# Patient Record
Sex: MALE | Race: ASIAN | HISPANIC OR LATINO
[De-identification: names, ages, dates, MRNs, and addresses within clinical notes are randomized per-mention and may not be internally consistent; named-entity substitution may affect disease eponyms.]

---

## 2024-06-18 PROBLEM — Z00.00 ENCOUNTER FOR PREVENTIVE HEALTH EXAMINATION: Status: ACTIVE | Noted: 2024-06-18

## 2024-06-19 ENCOUNTER — APPOINTMENT (OUTPATIENT)
Dept: UROLOGY | Facility: CLINIC | Age: 89
End: 2024-06-19
Payer: MEDICARE

## 2024-06-19 VITALS
HEART RATE: 85 BPM | HEIGHT: 64 IN | BODY MASS INDEX: 28.68 KG/M2 | WEIGHT: 168 LBS | DIASTOLIC BLOOD PRESSURE: 55 MMHG | OXYGEN SATURATION: 98 % | SYSTOLIC BLOOD PRESSURE: 160 MMHG

## 2024-06-19 DIAGNOSIS — Z87.440 PERSONAL HISTORY OF URINARY (TRACT) INFECTIONS: ICD-10-CM

## 2024-06-19 LAB
BILIRUB UR QL STRIP: NORMAL
CLARITY UR: CLEAR
COLLECTION METHOD: NORMAL
GLUCOSE UR-MCNC: NORMAL
HCG UR QL: 0.2 EU/DL
HGB UR QL STRIP.AUTO: NORMAL
KETONES UR-MCNC: NORMAL
LEUKOCYTE ESTERASE UR QL STRIP: NORMAL
NITRITE UR QL STRIP: NORMAL
PH UR STRIP: 5.5
PROT UR STRIP-MCNC: ABNORMAL
SP GR UR STRIP: 1.02

## 2024-06-19 PROCEDURE — 81003 URINALYSIS AUTO W/O SCOPE: CPT | Mod: QW

## 2024-06-19 PROCEDURE — 99203 OFFICE O/P NEW LOW 30 MIN: CPT | Mod: 25

## 2024-06-19 RX ORDER — TAMSULOSIN HYDROCHLORIDE 0.4 MG/1
0.4 CAPSULE ORAL
Qty: 30 | Refills: 5 | Status: ACTIVE | COMMUNITY
Start: 2024-06-19 | End: 1900-01-01

## 2024-06-22 ENCOUNTER — OUTPATIENT (OUTPATIENT)
Dept: OUTPATIENT SERVICES | Facility: HOSPITAL | Age: 89
LOS: 1 days | End: 2024-06-22
Payer: MEDICARE

## 2024-06-22 ENCOUNTER — APPOINTMENT (OUTPATIENT)
Dept: ULTRASOUND IMAGING | Facility: CLINIC | Age: 89
End: 2024-06-22
Payer: MEDICARE

## 2024-06-22 DIAGNOSIS — Z87.440 PERSONAL HISTORY OF URINARY (TRACT) INFECTIONS: ICD-10-CM

## 2024-06-22 PROCEDURE — 76770 US EXAM ABDO BACK WALL COMP: CPT

## 2024-06-22 PROCEDURE — 76770 US EXAM ABDO BACK WALL COMP: CPT | Mod: 26

## 2024-06-28 ENCOUNTER — APPOINTMENT (OUTPATIENT)
Dept: UROLOGY | Facility: CLINIC | Age: 89
End: 2024-06-28

## 2024-06-28 LAB
BILIRUB UR QL STRIP: NORMAL
CLARITY UR: CLEAR
COLLECTION METHOD: NORMAL
GLUCOSE UR-MCNC: NORMAL
HCG UR QL: 2 EU/DL
HGB UR QL STRIP.AUTO: NORMAL
KETONES UR-MCNC: NORMAL
LEUKOCYTE ESTERASE UR QL STRIP: NORMAL
NITRITE UR QL STRIP: NORMAL
PH UR STRIP: 5.5
PROT UR STRIP-MCNC: NORMAL
SP GR UR STRIP: 1.02

## 2024-06-28 PROCEDURE — 81003 URINALYSIS AUTO W/O SCOPE: CPT | Mod: QW

## 2024-06-28 PROCEDURE — 99213 OFFICE O/P EST LOW 20 MIN: CPT | Mod: 25

## 2024-06-30 NOTE — HISTORY OF PRESENT ILLNESS
[FreeTextEntry1] : Hx of enlarged prostate and UTI seen at [facility] [Nocturia] : nocturia [Weak Stream] : weak stream [Dysuria] : no dysuria [Hematuria - Gross] : no gross hematuria

## 2024-06-30 NOTE — PHYSICAL EXAM
[General Appearance - In No Acute Distress] : no acute distress [] : no respiratory distress [Not Anxious] : not anxious

## 2024-07-26 ENCOUNTER — APPOINTMENT (OUTPATIENT)
Dept: UROLOGY | Facility: CLINIC | Age: 89
End: 2024-07-26

## 2024-07-26 VITALS
DIASTOLIC BLOOD PRESSURE: 69 MMHG | WEIGHT: 161 LBS | SYSTOLIC BLOOD PRESSURE: 163 MMHG | BODY MASS INDEX: 27.64 KG/M2 | HEART RATE: 88 BPM

## 2024-07-26 DIAGNOSIS — Z87.440 PERSONAL HISTORY OF URINARY (TRACT) INFECTIONS: ICD-10-CM

## 2024-07-26 LAB
BILIRUB UR QL STRIP: NORMAL
CLARITY UR: CLEAR
COLLECTION METHOD: NORMAL
GLUCOSE UR-MCNC: NORMAL
HCG UR QL: 4 EU/DL
HGB UR QL STRIP.AUTO: ABNORMAL
KETONES UR-MCNC: ABNORMAL
LEUKOCYTE ESTERASE UR QL STRIP: ABNORMAL
NITRITE UR QL STRIP: POSITIVE
PH UR STRIP: 5.5
PROT UR STRIP-MCNC: ABNORMAL
SP GR UR STRIP: 1.02

## 2024-07-26 PROCEDURE — 99213 OFFICE O/P EST LOW 20 MIN: CPT | Mod: 25

## 2024-07-26 PROCEDURE — 81003 URINALYSIS AUTO W/O SCOPE: CPT | Mod: QW

## 2024-07-26 RX ORDER — CEPHALEXIN 500 MG/1
500 CAPSULE ORAL
Qty: 10 | Refills: 0 | Status: ACTIVE | COMMUNITY
Start: 2024-07-26 | End: 1900-01-01

## 2024-07-28 ENCOUNTER — INPATIENT (INPATIENT)
Facility: HOSPITAL | Age: 89
LOS: 3 days | Discharge: HOME | End: 2024-08-01
Attending: INTERNAL MEDICINE | Admitting: INTERNAL MEDICINE
Payer: MEDICARE

## 2024-07-28 VITALS
WEIGHT: 169.98 LBS | OXYGEN SATURATION: 98 % | SYSTOLIC BLOOD PRESSURE: 102 MMHG | TEMPERATURE: 103 F | RESPIRATION RATE: 18 BRPM | HEIGHT: 65 IN | HEART RATE: 64 BPM | DIASTOLIC BLOOD PRESSURE: 60 MMHG

## 2024-07-28 LAB
ALBUMIN SERPL ELPH-MCNC: 3.9 G/DL — SIGNIFICANT CHANGE UP (ref 3.3–5)
ALP SERPL-CCNC: 78 U/L — SIGNIFICANT CHANGE UP (ref 40–120)
ALT FLD-CCNC: 6 U/L — SIGNIFICANT CHANGE UP (ref 4–41)
ANION GAP SERPL CALC-SCNC: 15 MMOL/L — HIGH (ref 7–14)
APPEARANCE UR: ABNORMAL
APTT BLD: 28.7 SEC — SIGNIFICANT CHANGE UP (ref 24.5–35.6)
AST SERPL-CCNC: 11 U/L — SIGNIFICANT CHANGE UP (ref 4–40)
BASOPHILS # BLD AUTO: 0 K/UL — SIGNIFICANT CHANGE UP (ref 0–0.2)
BASOPHILS NFR BLD AUTO: 0 % — SIGNIFICANT CHANGE UP (ref 0–2)
BILIRUB SERPL-MCNC: 0.8 MG/DL — SIGNIFICANT CHANGE UP (ref 0.2–1.2)
BILIRUB UR-MCNC: NEGATIVE — SIGNIFICANT CHANGE UP
BUN SERPL-MCNC: 15 MG/DL — SIGNIFICANT CHANGE UP (ref 7–23)
CALCIUM SERPL-MCNC: 8.8 MG/DL — SIGNIFICANT CHANGE UP (ref 8.4–10.5)
CHLORIDE SERPL-SCNC: 101 MMOL/L — SIGNIFICANT CHANGE UP (ref 98–107)
CO2 SERPL-SCNC: 21 MMOL/L — LOW (ref 22–31)
COLOR SPEC: YELLOW — SIGNIFICANT CHANGE UP
CREAT SERPL-MCNC: 0.95 MG/DL — SIGNIFICANT CHANGE UP (ref 0.5–1.3)
DIFF PNL FLD: ABNORMAL
EGFR: 76 ML/MIN/1.73M2 — SIGNIFICANT CHANGE UP
EOSINOPHIL # BLD AUTO: 0.22 K/UL — SIGNIFICANT CHANGE UP (ref 0–0.5)
EOSINOPHIL NFR BLD AUTO: 2.7 % — SIGNIFICANT CHANGE UP (ref 0–6)
FLUAV AG NPH QL: SIGNIFICANT CHANGE UP
FLUBV AG NPH QL: SIGNIFICANT CHANGE UP
GAS PNL BLDV: SIGNIFICANT CHANGE UP
GLUCOSE SERPL-MCNC: 104 MG/DL — HIGH (ref 70–99)
GLUCOSE UR QL: NEGATIVE MG/DL — SIGNIFICANT CHANGE UP
HCT VFR BLD CALC: 27.5 % — LOW (ref 39–50)
HGB BLD-MCNC: 8.7 G/DL — LOW (ref 13–17)
IANC: 7.43 K/UL — HIGH (ref 1.8–7.4)
INR BLD: 1.21 RATIO — HIGH (ref 0.85–1.18)
KETONES UR-MCNC: NEGATIVE MG/DL — SIGNIFICANT CHANGE UP
LEUKOCYTE ESTERASE UR-ACNC: ABNORMAL
LYMPHOCYTES # BLD AUTO: 0.28 K/UL — LOW (ref 1–3.3)
LYMPHOCYTES # BLD AUTO: 3.5 % — LOW (ref 13–44)
MCHC RBC-ENTMCNC: 28.7 PG — SIGNIFICANT CHANGE UP (ref 27–34)
MCHC RBC-ENTMCNC: 31.6 GM/DL — LOW (ref 32–36)
MCV RBC AUTO: 90.8 FL — SIGNIFICANT CHANGE UP (ref 80–100)
MONOCYTES # BLD AUTO: 0.07 K/UL — SIGNIFICANT CHANGE UP (ref 0–0.9)
MONOCYTES NFR BLD AUTO: 0.9 % — LOW (ref 2–14)
NEUTROPHILS # BLD AUTO: 7.41 K/UL — HIGH (ref 1.8–7.4)
NEUTROPHILS NFR BLD AUTO: 86.7 % — HIGH (ref 43–77)
NITRITE UR-MCNC: NEGATIVE — SIGNIFICANT CHANGE UP
PH UR: 6 — SIGNIFICANT CHANGE UP (ref 5–8)
PLATELET # BLD AUTO: 418 K/UL — HIGH (ref 150–400)
POTASSIUM SERPL-MCNC: 3.7 MMOL/L — SIGNIFICANT CHANGE UP (ref 3.5–5.3)
POTASSIUM SERPL-SCNC: 3.7 MMOL/L — SIGNIFICANT CHANGE UP (ref 3.5–5.3)
PROT SERPL-MCNC: 6.9 G/DL — SIGNIFICANT CHANGE UP (ref 6–8.3)
PROT UR-MCNC: 30 MG/DL
PROTHROM AB SERPL-ACNC: 13.5 SEC — HIGH (ref 9.5–13)
RBC # BLD: 3.03 M/UL — LOW (ref 4.2–5.8)
RBC # FLD: 15.7 % — HIGH (ref 10.3–14.5)
RSV RNA NPH QL NAA+NON-PROBE: SIGNIFICANT CHANGE UP
SARS-COV-2 RNA SPEC QL NAA+PROBE: SIGNIFICANT CHANGE UP
SODIUM SERPL-SCNC: 137 MMOL/L — SIGNIFICANT CHANGE UP (ref 135–145)
SP GR SPEC: 1.02 — SIGNIFICANT CHANGE UP (ref 1–1.03)
UROBILINOGEN FLD QL: 1 MG/DL — SIGNIFICANT CHANGE UP (ref 0.2–1)
WBC # BLD: 8.13 K/UL — SIGNIFICANT CHANGE UP (ref 3.8–10.5)
WBC # FLD AUTO: 8.13 K/UL — SIGNIFICANT CHANGE UP (ref 3.8–10.5)

## 2024-07-28 PROCEDURE — 99285 EMERGENCY DEPT VISIT HI MDM: CPT

## 2024-07-28 RX ORDER — ACETAMINOPHEN 500 MG
1000 TABLET ORAL ONCE
Refills: 0 | Status: COMPLETED | OUTPATIENT
Start: 2024-07-28 | End: 2024-07-28

## 2024-07-28 RX ORDER — BACTERIOSTATIC SODIUM CHLORIDE 0.9 %
1000 VIAL (ML) INJECTION ONCE
Refills: 0 | Status: COMPLETED | OUTPATIENT
Start: 2024-07-28 | End: 2024-07-28

## 2024-07-28 RX ADMIN — Medication 1000 MILLILITER(S): at 18:54

## 2024-07-28 RX ADMIN — Medication 400 MILLIGRAM(S): at 19:15

## 2024-07-28 RX ADMIN — Medication 100 MILLIGRAM(S): at 19:46

## 2024-07-28 NOTE — ED ADULT TRIAGE NOTE - CHIEF COMPLAINT QUOTE
pain to penis ,pain upon urinating   increased today   started on antibiotics thursday- shaking chills today

## 2024-07-28 NOTE — ED ADULT NURSE NOTE - NSFALLRISKINTERV_ED_ALL_ED

## 2024-07-28 NOTE — ED PROVIDER NOTE - OBJECTIVE STATEMENT
Attending/Garo: 89-year-old male history of hypertension, from home, treated this past June for UTI now presents with 1 week of dysuria, increased urinary frequency, and now 1 day of chills and fatigue.  Patient was reportedly seen and evaluated by his urologist this past Thursday and started on Keflex.  Patient is accompanied by his son who is at bedside and providing translation at patient's request.    Meds: Keflex 500mg, Tamsulosin, Amlodipine

## 2024-07-28 NOTE — ED PROVIDER NOTE - CLINICAL SUMMARY MEDICAL DECISION MAKING FREE TEXT BOX
A/P   89-year-old  male  with recent treatment for UTI last month who now presents with recurrent symptoms of 1 week of dysuria and increased urinary frequency, and 1 day of fever/chills.  Patient on day 3 of outpatient antibiotics, Keflex, without improvement.  Exam noted for lungs clear to auscultation, normal  exam.  Plan: Screening EKG, sepsis protocol, IV fluids, IV antibiotics, admit

## 2024-07-28 NOTE — ED ADULT NURSE REASSESSMENT NOTE - NS ED NURSE REASSESS COMMENT FT1
Patient appears to be resting in bed,  Breathing even and unlabored, pallor/diaphoresis not noted. Patient placed on 2L NC for comfort, MD SUZANNE Corrales made aware of rectal temp, plan is to given abx and let remain amount of fluid finish.  will continue to monitor.

## 2024-07-28 NOTE — ED PROVIDER NOTE - PHYSICAL EXAMINATION
Attending/Garo: NAD; PERRL/EOMI, non-icterus, supple, no MARK, no JVD, RRR, CTAB; Abd-soft, NT/ND, no HSM; no LE edema, A&Ox2 (name, place), nonfocal; Skin-warm/dry  -uncircumcised, no d/c, no erythema, no STS

## 2024-07-28 NOTE — ED ADULT NURSE NOTE - OBJECTIVE STATEMENT
Kae RN: 89 year old male, received to room 9. A&Ox4, ambulatory at baseline. Respirations equal and unlabored. Pt Fijian speaking, son at bedside requesting to translate. Past medical history of HTN, HLD. Pt c/o fevers and chills x1 day. Pt diagnosed with UTI on Thursday and started on oral antibiotics. Pt son states he has been hospitalized for previous UTI's. Pt currently denies cough, congestion, chest pain, SOB, palpitations, dizziness, blurry vision, blood in urine, urinary symptoms, nausea, vomiting, diarrhea. 20G IV placed to L AC. Labs obtained. Handoff report given to Primary RN Arnaud.

## 2024-07-29 DIAGNOSIS — E03.9 HYPOTHYROIDISM, UNSPECIFIED: ICD-10-CM

## 2024-07-29 DIAGNOSIS — D64.9 ANEMIA, UNSPECIFIED: ICD-10-CM

## 2024-07-29 DIAGNOSIS — Z98.890 OTHER SPECIFIED POSTPROCEDURAL STATES: Chronic | ICD-10-CM

## 2024-07-29 DIAGNOSIS — N12 TUBULO-INTERSTITIAL NEPHRITIS, NOT SPECIFIED AS ACUTE OR CHRONIC: ICD-10-CM

## 2024-07-29 DIAGNOSIS — I10 ESSENTIAL (PRIMARY) HYPERTENSION: ICD-10-CM

## 2024-07-29 DIAGNOSIS — A41.9 SEPSIS, UNSPECIFIED ORGANISM: ICD-10-CM

## 2024-07-29 DIAGNOSIS — Z29.9 ENCOUNTER FOR PROPHYLACTIC MEASURES, UNSPECIFIED: ICD-10-CM

## 2024-07-29 DIAGNOSIS — N40.0 BENIGN PROSTATIC HYPERPLASIA WITHOUT LOWER URINARY TRACT SYMPTOMS: ICD-10-CM

## 2024-07-29 LAB
ANION GAP SERPL CALC-SCNC: 10 MMOL/L — SIGNIFICANT CHANGE UP (ref 7–14)
BUN SERPL-MCNC: 12 MG/DL — SIGNIFICANT CHANGE UP (ref 7–23)
CALCIUM SERPL-MCNC: 8.1 MG/DL — LOW (ref 8.4–10.5)
CHLORIDE SERPL-SCNC: 103 MMOL/L — SIGNIFICANT CHANGE UP (ref 98–107)
CO2 SERPL-SCNC: 24 MMOL/L — SIGNIFICANT CHANGE UP (ref 22–31)
CREAT SERPL-MCNC: 0.88 MG/DL — SIGNIFICANT CHANGE UP (ref 0.5–1.3)
E COLI DNA BLD POS QL NAA+NON-PROBE: SIGNIFICANT CHANGE UP
EGFR: 81 ML/MIN/1.73M2 — SIGNIFICANT CHANGE UP
FOLATE SERPL-MCNC: 13.3 NG/ML — SIGNIFICANT CHANGE UP (ref 3.1–17.5)
GLUCOSE SERPL-MCNC: 136 MG/DL — HIGH (ref 70–99)
GRAM STN FLD: ABNORMAL
GRAM STN FLD: ABNORMAL
HCT VFR BLD CALC: 23.5 % — LOW (ref 39–50)
HGB BLD-MCNC: 7.4 G/DL — LOW (ref 13–17)
IRON SATN MFR SERPL: 4 % — LOW (ref 14–50)
IRON SATN MFR SERPL: 8 UG/DL — LOW (ref 45–165)
MAGNESIUM SERPL-MCNC: 1.6 MG/DL — SIGNIFICANT CHANGE UP (ref 1.6–2.6)
MCHC RBC-ENTMCNC: 28.2 PG — SIGNIFICANT CHANGE UP (ref 27–34)
MCHC RBC-ENTMCNC: 31.5 GM/DL — LOW (ref 32–36)
MCV RBC AUTO: 89.7 FL — SIGNIFICANT CHANGE UP (ref 80–100)
METHOD TYPE: SIGNIFICANT CHANGE UP
NRBC # BLD: 0 /100 WBCS — SIGNIFICANT CHANGE UP (ref 0–0)
NRBC # FLD: 0 K/UL — SIGNIFICANT CHANGE UP (ref 0–0)
PHOSPHATE SERPL-MCNC: 2.6 MG/DL — SIGNIFICANT CHANGE UP (ref 2.5–4.5)
PLATELET # BLD AUTO: 342 K/UL — SIGNIFICANT CHANGE UP (ref 150–400)
POTASSIUM SERPL-MCNC: 3.6 MMOL/L — SIGNIFICANT CHANGE UP (ref 3.5–5.3)
POTASSIUM SERPL-SCNC: 3.6 MMOL/L — SIGNIFICANT CHANGE UP (ref 3.5–5.3)
RBC # BLD: 2.62 M/UL — LOW (ref 4.2–5.8)
RBC # FLD: 15.9 % — HIGH (ref 10.3–14.5)
SODIUM SERPL-SCNC: 137 MMOL/L — SIGNIFICANT CHANGE UP (ref 135–145)
SPECIMEN SOURCE: SIGNIFICANT CHANGE UP
SPECIMEN SOURCE: SIGNIFICANT CHANGE UP
TIBC SERPL-MCNC: 221 UG/DL — SIGNIFICANT CHANGE UP (ref 220–430)
TSH SERPL-MCNC: 7.17 UIU/ML — HIGH (ref 0.27–4.2)
UIBC SERPL-MCNC: 213 UG/DL — SIGNIFICANT CHANGE UP (ref 110–370)
VIT B12 SERPL-MCNC: 614 PG/ML — SIGNIFICANT CHANGE UP (ref 200–900)
WBC # BLD: 9.72 K/UL — SIGNIFICANT CHANGE UP (ref 3.8–10.5)
WBC # FLD AUTO: 9.72 K/UL — SIGNIFICANT CHANGE UP (ref 3.8–10.5)

## 2024-07-29 PROCEDURE — 74177 CT ABD & PELVIS W/CONTRAST: CPT | Mod: 26

## 2024-07-29 PROCEDURE — 71045 X-RAY EXAM CHEST 1 VIEW: CPT | Mod: 26

## 2024-07-29 PROCEDURE — 99223 1ST HOSP IP/OBS HIGH 75: CPT

## 2024-07-29 RX ORDER — ACETAMINOPHEN 500 MG
1000 TABLET ORAL ONCE
Refills: 0 | Status: COMPLETED | OUTPATIENT
Start: 2024-07-29 | End: 2024-07-29

## 2024-07-29 RX ORDER — MELATONIN 3 MG
3 TABLET ORAL AT BEDTIME
Refills: 0 | Status: DISCONTINUED | OUTPATIENT
Start: 2024-07-29 | End: 2024-08-01

## 2024-07-29 RX ORDER — PIPERACILLIN SODIUM, TAZOBACTAM SODIUM 3; .375 G/15ML; G/15ML
3.38 INJECTION, POWDER, LYOPHILIZED, FOR SOLUTION INTRAVENOUS ONCE
Refills: 0 | Status: COMPLETED | OUTPATIENT
Start: 2024-07-29 | End: 2024-07-29

## 2024-07-29 RX ORDER — MAGNESIUM, ALUMINUM HYDROXIDE 200-225/5
30 SUSPENSION, ORAL (FINAL DOSE FORM) ORAL EVERY 4 HOURS
Refills: 0 | Status: DISCONTINUED | OUTPATIENT
Start: 2024-07-29 | End: 2024-08-01

## 2024-07-29 RX ORDER — PIPERACILLIN SODIUM, TAZOBACTAM SODIUM 3; .375 G/15ML; G/15ML
3.38 INJECTION, POWDER, LYOPHILIZED, FOR SOLUTION INTRAVENOUS EVERY 8 HOURS
Refills: 0 | Status: DISCONTINUED | OUTPATIENT
Start: 2024-07-29 | End: 2024-07-29

## 2024-07-29 RX ORDER — ASPIRIN 500 MG
81 TABLET ORAL DAILY
Refills: 0 | Status: DISCONTINUED | OUTPATIENT
Start: 2024-07-29 | End: 2024-08-01

## 2024-07-29 RX ORDER — ENOXAPARIN SODIUM 120 MG/.8ML
40 INJECTION SUBCUTANEOUS EVERY 24 HOURS
Refills: 0 | Status: DISCONTINUED | OUTPATIENT
Start: 2024-07-29 | End: 2024-08-01

## 2024-07-29 RX ORDER — VANCOMYCIN HYDROCHLORIDE 5 G/100ML
1000 INJECTION, POWDER, LYOPHILIZED, FOR SOLUTION INTRAVENOUS ONCE
Refills: 0 | Status: COMPLETED | OUTPATIENT
Start: 2024-07-29 | End: 2024-07-29

## 2024-07-29 RX ORDER — PANTOPRAZOLE SODIUM 20 MG/1
40 TABLET, DELAYED RELEASE ORAL
Refills: 0 | Status: DISCONTINUED | OUTPATIENT
Start: 2024-07-29 | End: 2024-08-01

## 2024-07-29 RX ORDER — TAMSULOSIN HCL 0.4 MG
0.4 CAPSULE ORAL AT BEDTIME
Refills: 0 | Status: DISCONTINUED | OUTPATIENT
Start: 2024-07-29 | End: 2024-08-01

## 2024-07-29 RX ORDER — ACETAMINOPHEN 500 MG
650 TABLET ORAL EVERY 6 HOURS
Refills: 0 | Status: DISCONTINUED | OUTPATIENT
Start: 2024-07-29 | End: 2024-08-01

## 2024-07-29 RX ORDER — ONDANSETRON HCL/PF 4 MG/2 ML
4 VIAL (ML) INJECTION EVERY 8 HOURS
Refills: 0 | Status: DISCONTINUED | OUTPATIENT
Start: 2024-07-29 | End: 2024-08-01

## 2024-07-29 RX ORDER — BACTERIOSTATIC SODIUM CHLORIDE 0.9 %
1000 VIAL (ML) INJECTION
Refills: 0 | Status: DISCONTINUED | OUTPATIENT
Start: 2024-07-29 | End: 2024-07-29

## 2024-07-29 RX ADMIN — PIPERACILLIN SODIUM, TAZOBACTAM SODIUM 25 GRAM(S): 3; .375 INJECTION, POWDER, LYOPHILIZED, FOR SOLUTION INTRAVENOUS at 06:30

## 2024-07-29 RX ADMIN — Medication 0.4 MILLIGRAM(S): at 22:09

## 2024-07-29 RX ADMIN — PANTOPRAZOLE SODIUM 40 MILLIGRAM(S): 20 TABLET, DELAYED RELEASE ORAL at 14:08

## 2024-07-29 RX ADMIN — VANCOMYCIN HYDROCHLORIDE 250 MILLIGRAM(S): 5 INJECTION, POWDER, LYOPHILIZED, FOR SOLUTION INTRAVENOUS at 04:54

## 2024-07-29 RX ADMIN — ENOXAPARIN SODIUM 40 MILLIGRAM(S): 120 INJECTION SUBCUTANEOUS at 06:30

## 2024-07-29 RX ADMIN — PIPERACILLIN SODIUM, TAZOBACTAM SODIUM 200 GRAM(S): 3; .375 INJECTION, POWDER, LYOPHILIZED, FOR SOLUTION INTRAVENOUS at 01:16

## 2024-07-29 RX ADMIN — Medication 81 MILLIGRAM(S): at 11:13

## 2024-07-29 RX ADMIN — Medication 100 MILLIGRAM(S): at 11:12

## 2024-07-29 RX ADMIN — Medication 400 MILLIGRAM(S): at 14:56

## 2024-07-29 RX ADMIN — Medication 400 MILLIGRAM(S): at 04:53

## 2024-07-29 NOTE — PROGRESS NOTE ADULT - PROBLEM SELECTOR PLAN 3
-unknown baseline hgb  -no evidence of acute bleeding  -check iron studies, tibc, b12, folate  -transfuse for hgb <7 -unknown baseline hgb  -no evidence of acute bleeding  -Ferritin and reticulocyte count   -transfuse for hgb <7

## 2024-07-29 NOTE — PROGRESS NOTE ADULT - PROBLEM SELECTOR PLAN 1
-Pt p/w tmax 103, history + UA. UA currently with no bacteria, likely because patient on antibiotics  -given pt meets criteria for severe sepsis (lactic acidosis, mild hypotension) and with recent hospitalization, treat with zosyn and vanc x1  -f/u urine cultures done as outpatient (urologist Dr. Daniel Norton, part of [facility])  -f/u inpatient urine cx and blood cx  -f/u mrsa swab  -CT A/P pending to eval for renal abscess, infected stones   -if pt continues to spike fevers or has HD instability, broaden to meropenem   -CXR pending to eval for resp source of infection although lower suspicion given asymptomatic Pt p/w tmax 103, history + UA. UA currently with no bacteria, likely because patient on antibiotics. given pt meets criteria for severe sepsis (lactic acidosis, mild hypotension) and with recent hospitalization, treat with zosyn and vanc x1    -f/u urine cultures done as outpatient (urologist Dr. Daniel Norton, part of [facility])  -f/u inpatient urine cx and blood cx  -f/u mrsa swab  -CTAP not concerning for pyelonephritis   -if pt continues to spike fevers or has HD instability, broaden to meropenem   -CXR negative   -Switch from Zosyn to Ceftriaxone

## 2024-07-29 NOTE — PROGRESS NOTE ADULT - PROBLEM SELECTOR PLAN 5
DVT ppx: lovenox TSH elevated, likely in the setting of severe sepsis, but patient could benefit from outpatient f/u    -CTM

## 2024-07-29 NOTE — PHYSICAL THERAPY INITIAL EVALUATION ADULT - PERTINENT HX OF CURRENT PROBLEM, REHAB EVAL
Patient is 89-year-old male history of hypertension, BPH  presents with 1 week of dysuria, increased urinary frequency, and now 1 day of rigors/chills and fatigue admitted for sepsis secondary to  pyelonephritis

## 2024-07-29 NOTE — PROGRESS NOTE ADULT - SUBJECTIVE AND OBJECTIVE BOX
***************************************************************  Shant Myles  (PGY1) Internal Medicine  On TEAMS  ***************************************************************    PROGRESS NOTE:     Patient is a 89y old  Male who presents with a chief complaint of rigors (29 Jul 2024 03:06)        INTERVAL EVENTS:   SUBJECTIVE / OVERNIGHT EVENTS: No acute overnight events. Patient was seen and examined by the bedside this AM.   OXYGEN:   TELEMETRY:       MEDICATIONS  (STANDING):  aspirin enteric coated 81 milliGRAM(s) Oral daily  enoxaparin Injectable 40 milliGRAM(s) SubCutaneous every 24 hours  piperacillin/tazobactam IVPB.. 3.375 Gram(s) IV Intermittent every 8 hours    MEDICATIONS  (PRN):  acetaminophen     Tablet .. 650 milliGRAM(s) Oral every 6 hours PRN Temp greater or equal to 38C (100.4F), Mild Pain (1 - 3)  aluminum hydroxide/magnesium hydroxide/simethicone Suspension 30 milliLiter(s) Oral every 4 hours PRN Dyspepsia  melatonin 3 milliGRAM(s) Oral at bedtime PRN Insomnia  ondansetron Injectable 4 milliGRAM(s) IV Push every 8 hours PRN Nausea and/or Vomiting      CAPILLARY BLOOD GLUCOSE        I&O's Summary      PHYSICAL EXAM:  Vital Signs Last 24 Hrs  T(C): 37.3 (29 Jul 2024 06:33), Max: 39.8 (28 Jul 2024 19:38)  T(F): 99.1 (29 Jul 2024 06:33), Max: 103.6 (28 Jul 2024 19:38)  HR: 71 (29 Jul 2024 06:33) (64 - 107)  BP: 128/55 (29 Jul 2024 06:33) (102/60 - 145/50)  BP(mean): 76 (28 Jul 2024 18:36) (76 - 76)  RR: 20 (29 Jul 2024 06:33) (18 - 25)  SpO2: 100% (29 Jul 2024 06:33) (95% - 100%)    Parameters below as of 29 Jul 2024 06:33  Patient On (Oxygen Delivery Method): nasal cannula  O2 Flow (L/min): 2      PHYSICAL EXAM:  GENERAL: NAD, well-groomed, well-developed  HEAD:  Atraumatic, Normocephalic  EYES: EOMI, PERRLA, conjunctiva and sclera clear  ENMT: No tonsillar erythema, exudates, or enlargement; Moist mucous membranes, Good dentition, No lesions  NECK: Supple, No JVD, Normal thyroid  NERVOUS SYSTEM:  Alert & Oriented X3, Good concentration; Motor Strength 5/5 B/L upper and lower extremities; DTRs 2+ intact and symmetric  CHEST/LUNG: Clear to auscultation bilaterally; No rales, rhonchi, wheezing, or rubs  HEART: Regular rate and rhythm; No murmurs, rubs, or gallops  ABDOMEN: Soft, Nontender, Nondistended; Bowel sounds present  EXTREMITIES:  2+ Peripheral Pulses, No clubbing, cyanosis, or edema  LYMPH: No lymphadenopathy noted  SKIN: No rashes or lesions    LABS:                        7.4    9.72  )-----------( 342      ( 29 Jul 2024 05:40 )             23.5     07-29    137  |  103  |  12  ----------------------------<  136<H>  3.6   |  24  |  0.88    Ca    8.1<L>      29 Jul 2024 05:40  Phos  2.6     07-29  Mg     1.60     07-29    TPro  6.9  /  Alb  3.9  /  TBili  0.8  /  DBili  x   /  AST  11  /  ALT  6   /  AlkPhos  78  07-28    PT/INR - ( 28 Jul 2024 18:44 )   PT: 13.5 sec;   INR: 1.21 ratio         PTT - ( 28 Jul 2024 18:44 )  PTT:28.7 sec      Urinalysis Basic - ( 29 Jul 2024 05:40 )    Color: x / Appearance: x / SG: x / pH: x  Gluc: 136 mg/dL / Ketone: x  / Bili: x / Urobili: x   Blood: x / Protein: x / Nitrite: x   Leuk Esterase: x / RBC: x / WBC x   Sq Epi: x / Non Sq Epi: x / Bacteria: x        Urinalysis with Rflx Culture (collected 28 Jul 2024 21:54)        COORDINATION OF CARE:  Care Discussed with Consultants/Other Providers [Y/N]:  Prior or Outpatient Records Reviewed [Y/N]: ***************************************************************  Shant Myles  (PGY1) Internal Medicine  On TEAMS  ***************************************************************    PROGRESS NOTE:     Patient is a 89y old  Male who presents with a chief complaint of rigors (29 Jul 2024 03:06)        INTERVAL EVENTS:   SUBJECTIVE / OVERNIGHT EVENTS: No acute overnight events. Patient was seen and examined by the bedside this AM. Patient reports fevers, chills, dysuria for the past week.         MEDICATIONS  (STANDING):  aspirin enteric coated 81 milliGRAM(s) Oral daily  enoxaparin Injectable 40 milliGRAM(s) SubCutaneous every 24 hours  piperacillin/tazobactam IVPB.. 3.375 Gram(s) IV Intermittent every 8 hours    MEDICATIONS  (PRN):  acetaminophen     Tablet .. 650 milliGRAM(s) Oral every 6 hours PRN Temp greater or equal to 38C (100.4F), Mild Pain (1 - 3)  aluminum hydroxide/magnesium hydroxide/simethicone Suspension 30 milliLiter(s) Oral every 4 hours PRN Dyspepsia  melatonin 3 milliGRAM(s) Oral at bedtime PRN Insomnia  ondansetron Injectable 4 milliGRAM(s) IV Push every 8 hours PRN Nausea and/or Vomiting      CAPILLARY BLOOD GLUCOSE        I&O's Summary      PHYSICAL EXAM:  Vital Signs Last 24 Hrs  T(C): 37.3 (29 Jul 2024 06:33), Max: 39.8 (28 Jul 2024 19:38)  T(F): 99.1 (29 Jul 2024 06:33), Max: 103.6 (28 Jul 2024 19:38)  HR: 71 (29 Jul 2024 06:33) (64 - 107)  BP: 128/55 (29 Jul 2024 06:33) (102/60 - 145/50)  BP(mean): 76 (28 Jul 2024 18:36) (76 - 76)  RR: 20 (29 Jul 2024 06:33) (18 - 25)  SpO2: 100% (29 Jul 2024 06:33) (95% - 100%)    Parameters below as of 29 Jul 2024 06:33  Patient On (Oxygen Delivery Method): nasal cannula  O2 Flow (L/min): 2      PHYSICAL EXAM:  GENERAL: NAD  HEAD:  Atraumatic, Normocephalic  EYES: EOMI, PERRLA, conjunctiva and sclera clear  ENMT: No tonsillar erythema, exudates, or enlargement; Moist mucous membranes, Good dentition, No lesions  NECK: Supple, No JVD, Normal thyroid  NERVOUS SYSTEM:  Alert & Oriented X2, oriented to person and place, not time.   CHEST/LUNG: Clear to auscultation bilaterally; No rales, rhonchi, wheezing, or rubs  HEART: Regular rate and rhythm; No murmurs, rubs, or gallops  ABDOMEN: Soft, Nontender, Nondistended; Bowel sounds present. No suprapubic tenderness. No flank tenderness.   EXTREMITIES:  2+ Peripheral Pulses, No clubbing, cyanosis, or edema  LYMPH: No lymphadenopathy noted  SKIN: No rashes or lesions    LABS:                        7.4    9.72  )-----------( 342      ( 29 Jul 2024 05:40 )             23.5     07-29    137  |  103  |  12  ----------------------------<  136<H>  3.6   |  24  |  0.88    Ca    8.1<L>      29 Jul 2024 05:40  Phos  2.6     07-29  Mg     1.60     07-29    TPro  6.9  /  Alb  3.9  /  TBili  0.8  /  DBili  x   /  AST  11  /  ALT  6   /  AlkPhos  78  07-28    PT/INR - ( 28 Jul 2024 18:44 )   PT: 13.5 sec;   INR: 1.21 ratio         PTT - ( 28 Jul 2024 18:44 )  PTT:28.7 sec      Urinalysis Basic - ( 29 Jul 2024 05:40 )    Color: x / Appearance: x / SG: x / pH: x  Gluc: 136 mg/dL / Ketone: x  / Bili: x / Urobili: x   Blood: x / Protein: x / Nitrite: x   Leuk Esterase: x / RBC: x / WBC x   Sq Epi: x / Non Sq Epi: x / Bacteria: x        Urinalysis with Rflx Culture (collected 28 Jul 2024 21:54)        COORDINATION OF CARE:  Care Discussed with Consultants/Other Providers [Y/N]:  Prior or Outpatient Records Reviewed [Y/N]:

## 2024-07-29 NOTE — H&P ADULT - NSHPPHYSICALEXAM_GEN_ALL_CORE
GENERAL APPEARANCE: Well developed, alert and cooperative. NAD.   HEENT:  PERRL, EOMI. External auditory canals normal, hearing grossly intact.  NECK: Neck supple, non-tender   CARDIAC: Normal S1 and S2. No S3, S4 or murmurs. Rhythm is regular.  LUNGS: Clear to auscultation and percussion without rales, rhonchi, wheezing or diminished breath sounds.  ABDOMEN: Positive bowel sounds. Soft, nondistended, nontender. No guarding or rebound.   MUSCULOSKELETAL: ROM intact spine and extremities. No joint erythema or tenderness.   BACK: no flank tenderness   EXTREMITIES: No significant deformity or joint abnormality. No edema. Peripheral pulses intact.   NEUROLOGICAL: CN II-XII intact. Strength and sensation symmetric and intact throughout.   SKIN: Skin normal color, texture and turgor with no lesions or eruptions.  PSYCHIATRIC: AOx3.Normal affect and behavior.

## 2024-07-29 NOTE — H&P ADULT - PROBLEM SELECTOR PLAN 3
-unknown baseline hgb  -no evidence of acute bleeding  -check iron studies, tibc, b12, folate  -transfuse for hgb <7

## 2024-07-29 NOTE — H&P ADULT - PROBLEM SELECTOR PLAN 1
-Pt p/w tmax 103, history + UA. UA currently with no bacteria, likely because patient on antibiotics  -given pt meets criteria for severe sepsis (lactic acidosis, mild hypotension) and with recent hospitalization, treat with zosyn and vanc x1  -f/u urine cultures done as outpatient (urologist Dr. Daniel Norton, part of [facility])  -f/u inpatient urine cx and blood cx  -f/u mrsa swab  -CT A/P pending to eval for renal abscess, infected stones   -if pt continues to spike fevers or has HD instability, broaden to meropenem   -CXR pending to eval for resp source of infection although lower suspicion given asymptomatic

## 2024-07-29 NOTE — H&P ADULT - NSHPREVIEWOFSYSTEMS_GEN_ALL_CORE
CONSTITUTIONAL:  +fever, chills, fatigue No weight loss  HEENT:  Eyes:  No visual loss, blurred vision, double vision or yellow sclerae. Ears, Nose, Throat:  No hearing loss, sneezing, congestion, runny nose or sore throat.  SKIN:  No rash or itching.  CARDIOVASCULAR:  No chest pain, chest pressure or chest discomfort. No palpitations or edema.  RESPIRATORY:  No shortness of breath, cough or sputum.  GASTROINTESTINAL:  No anorexia, nausea, vomiting or diarrhea. No abdominal pain or blood.  GENITOURINARY:  +dysuria Denies hematuria.   NEUROLOGICAL:  No headache, dizziness, syncope, paralysis, ataxia, numbness or tingling in the extremities. No change in bowel or bladder control.  MUSCULOSKELETAL:  No muscle, back pain, joint pain or stiffness.  PSYCHIATRIC:  No history of depression or anxiety.  ENDOCRINOLOGIC:  No reports of sweating, cold or heat intolerance. No polyuria or polydipsia.  ALLERGIES:  No history of asthma, hives, eczema or rhinitis.

## 2024-07-29 NOTE — ED ADULT NURSE REASSESSMENT NOTE - NS ED NURSE REASSESS COMMENT FT1
Patient alert and responsive, Greek speaking, Patient noted to be febrile with a temp of 102F, hospitalist notified, ordered OFIRMEV, medication administered as ordered. Patient remains on CCM.

## 2024-07-29 NOTE — H&P ADULT - NSHPLABSRESULTS_GEN_ALL_CORE
( @ 18:44)                      8.7  8.13 )-----------( 418                 27.5    Neutrophils = 7.41 (86.7%)  Lymphocytes = 0.28 (3.5%)  Eosinophils = 0.22 (2.7%)  Basophils = 0.00 (0.0%)  Monocytes = 0.07 (0.9%)  Bands = 4.4%        137  |  101  |  15  ----------------------------<  104<H>  3.7   |  21<L>  |  0.95    Ca    8.8      2024 18:44    TPro  6.9  /  Alb  3.9  /  TBili  0.8  /  DBili  x   /  AST  11  /  ALT  6   /  AlkPhos  78      ( 2024 18:44 )   PT: 13.5 sec;   INR: 1.21 ratio;       PTT:28.7 sec      Venous Blood Gas:   @ 18:45  7.40/40/27/25/35.5  VBG Lactate: 3.5      Urinalysis Basic - ( 2024 21:54 )    Color: Yellow / Appearance: Cloudy / S.016 / pH: x  Gluc: x / Ketone: Negative mg/dL  / Bili: Negative / Urobili: 1.0 mg/dL   Blood: x / Protein: 30 mg/dL / Nitrite: Negative   Leuk Esterase: Large / RBC: 1 /HPF /  /HPF   Sq Epi: x / Non Sq Epi: 1 /HPF / Bacteria: Negative /HPF    Labs personally reviewed  Imaging reviewed  EKG: NSR no acute st changes

## 2024-07-29 NOTE — PHYSICAL THERAPY INITIAL EVALUATION ADULT - RANGE OF MOTION EXAMINATION, REHAB EVAL
never
bilateral upper extremity ROM was WFL (within functional limits)/bilateral lower extremity ROM was WFL (within functional limits)

## 2024-07-29 NOTE — PROGRESS NOTE ADULT - ATTENDING COMMENTS
89M with PMH of HTN, BPH presenting with fever. HX notable for recent urology visit and treatment for UTI. Despite tx w/ PO abx - symptoms did not improve. Pt presents for eval. On arrival - pt noted to be febrile/tachy w/ elevated lactate. UA grossly positive. CT AP showing signs consistent w/ cystitis. No renal abscess noted. Pt admitted for severe sepsis 2' UTI.    Pt seen and evaluated at bedside. Family present. Son Franky assisted w/ translation. No new complaints at this time. Reports feeling cold and shaky. On exam - elderly, NAD. Chest clear, abdomen obese, soft, NT. No suprapubic pain. Neg CVA tenderness. Neuro nonfocal. Labs notable for high lactate -> resolved w/ fluids. Blood cultures positive for GNR x2.    #Severe sepsis 2' E Coli UTI  #GNR bacteremia likely 2' E Coli UTI  #HTN  #BPH  #Lactic acidosis  #Anemia    -Reviewed OP records -> UCx showing E Coli. Sensitivities pending.  -Follow up culture sensitivities to guide therapy.  -Switched to CTX 1g q24h. If pt decomps - low threshold to broaden coverage.  -BP meds on hold. Restart tamsulosin for BPH.    Rest of plan as above.

## 2024-07-29 NOTE — H&P ADULT - ASSESSMENT
89-year-old male history of hypertension, BPH  presents with 1 week of dysuria, increased urinary frequency, and now 1 day of rigors/chills and fatigue admitted for sepsis 2/2 pyelonephritis

## 2024-07-29 NOTE — PROGRESS NOTE ADULT - PROBLEM SELECTOR PLAN 6
- Fluids: None  - Electrolytes: Will replete to maintain K>4, Phos>3, and Mag>2  - Nutrition: DASH   - Activity: As tolerated   - DVT Prophylaxis: Lovenox   - Disposition: F/u PT recs

## 2024-07-29 NOTE — H&P ADULT - HISTORY OF PRESENT ILLNESS
Additional collateral obtained from son over the phone    89-year-old male history of hypertension, BPH  presents with 1 week of dysuria, increased urinary frequency, and now 1 day of rigors/chills and fatigue.  Patient was reportedly seen and evaluated by his urologist (Dr. Daniel Norton) this past Thursday for dysuria and started on Keflex.  Despite starting antibiotics Friday, pt developed fevers and rigors. Son brought him to urgent care who recommended pt present to ED. Son reports that pt was hospitalized about 20 days ago for a UTI. Pt denies cough, shortness of breath, chest pain, flank pain, abdominal pain or LE edema.

## 2024-07-30 LAB
ADD ON TEST-SPECIMEN IN LAB: SIGNIFICANT CHANGE UP
ALBUMIN SERPL ELPH-MCNC: 3.3 G/DL — SIGNIFICANT CHANGE UP (ref 3.3–5)
ALP SERPL-CCNC: 61 U/L — SIGNIFICANT CHANGE UP (ref 40–120)
ALT FLD-CCNC: 15 U/L — SIGNIFICANT CHANGE UP (ref 4–41)
ANION GAP SERPL CALC-SCNC: 11 MMOL/L — SIGNIFICANT CHANGE UP (ref 7–14)
AST SERPL-CCNC: 18 U/L — SIGNIFICANT CHANGE UP (ref 4–40)
BASOPHILS # BLD AUTO: 0.02 K/UL — SIGNIFICANT CHANGE UP (ref 0–0.2)
BASOPHILS NFR BLD AUTO: 0.6 % — SIGNIFICANT CHANGE UP (ref 0–2)
BILIRUB SERPL-MCNC: 0.5 MG/DL — SIGNIFICANT CHANGE UP (ref 0.2–1.2)
BUN SERPL-MCNC: 14 MG/DL — SIGNIFICANT CHANGE UP (ref 7–23)
CALCIUM SERPL-MCNC: 8.3 MG/DL — LOW (ref 8.4–10.5)
CHLORIDE SERPL-SCNC: 106 MMOL/L — SIGNIFICANT CHANGE UP (ref 98–107)
CO2 SERPL-SCNC: 22 MMOL/L — SIGNIFICANT CHANGE UP (ref 22–31)
CREAT SERPL-MCNC: 0.92 MG/DL — SIGNIFICANT CHANGE UP (ref 0.5–1.3)
CULTURE RESULTS: SIGNIFICANT CHANGE UP
EGFR: 79 ML/MIN/1.73M2 — SIGNIFICANT CHANGE UP
EOSINOPHIL # BLD AUTO: 0.02 K/UL — SIGNIFICANT CHANGE UP (ref 0–0.5)
EOSINOPHIL NFR BLD AUTO: 0.6 % — SIGNIFICANT CHANGE UP (ref 0–6)
FERRITIN SERPL-MCNC: 144 NG/ML — SIGNIFICANT CHANGE UP (ref 30–400)
FLUAV AG NPH QL: SIGNIFICANT CHANGE UP
FLUBV AG NPH QL: SIGNIFICANT CHANGE UP
GLUCOSE SERPL-MCNC: 91 MG/DL — SIGNIFICANT CHANGE UP (ref 70–99)
HCT VFR BLD CALC: 26.6 % — LOW (ref 39–50)
HGB BLD-MCNC: 8.1 G/DL — LOW (ref 13–17)
IANC: 2.82 K/UL — SIGNIFICANT CHANGE UP (ref 1.8–7.4)
IMM GRANULOCYTES NFR BLD AUTO: 1.1 % — HIGH (ref 0–0.9)
LYMPHOCYTES # BLD AUTO: 0.32 K/UL — LOW (ref 1–3.3)
LYMPHOCYTES # BLD AUTO: 9 % — LOW (ref 13–44)
MAGNESIUM SERPL-MCNC: 1.9 MG/DL — SIGNIFICANT CHANGE UP (ref 1.6–2.6)
MCHC RBC-ENTMCNC: 28.1 PG — SIGNIFICANT CHANGE UP (ref 27–34)
MCHC RBC-ENTMCNC: 30.5 GM/DL — LOW (ref 32–36)
MCV RBC AUTO: 92.4 FL — SIGNIFICANT CHANGE UP (ref 80–100)
MONOCYTES # BLD AUTO: 0.34 K/UL — SIGNIFICANT CHANGE UP (ref 0–0.9)
MONOCYTES NFR BLD AUTO: 9.6 % — SIGNIFICANT CHANGE UP (ref 2–14)
MRSA PCR RESULT.: SIGNIFICANT CHANGE UP
NEUTROPHILS # BLD AUTO: 2.82 K/UL — SIGNIFICANT CHANGE UP (ref 1.8–7.4)
NEUTROPHILS NFR BLD AUTO: 79.1 % — HIGH (ref 43–77)
NRBC # BLD: 0 /100 WBCS — SIGNIFICANT CHANGE UP (ref 0–0)
NRBC # FLD: 0 K/UL — SIGNIFICANT CHANGE UP (ref 0–0)
PHOSPHATE SERPL-MCNC: 2.6 MG/DL — SIGNIFICANT CHANGE UP (ref 2.5–4.5)
PLATELET # BLD AUTO: 260 K/UL — SIGNIFICANT CHANGE UP (ref 150–400)
POTASSIUM SERPL-MCNC: 3.4 MMOL/L — LOW (ref 3.5–5.3)
POTASSIUM SERPL-SCNC: 3.4 MMOL/L — LOW (ref 3.5–5.3)
PROT SERPL-MCNC: 6.2 G/DL — SIGNIFICANT CHANGE UP (ref 6–8.3)
RBC # BLD: 2.88 M/UL — LOW (ref 4.2–5.8)
RBC # BLD: 2.88 M/UL — LOW (ref 4.2–5.8)
RBC # FLD: 16.2 % — HIGH (ref 10.3–14.5)
RETICS #: 62.2 K/UL — SIGNIFICANT CHANGE UP (ref 25–125)
RETICS/RBC NFR: 2.2 % — SIGNIFICANT CHANGE UP (ref 0.5–2.5)
RSV RNA NPH QL NAA+NON-PROBE: SIGNIFICANT CHANGE UP
S AUREUS DNA NOSE QL NAA+PROBE: SIGNIFICANT CHANGE UP
SARS-COV-2 RNA SPEC QL NAA+PROBE: DETECTED
SODIUM SERPL-SCNC: 139 MMOL/L — SIGNIFICANT CHANGE UP (ref 135–145)
SPECIMEN SOURCE: SIGNIFICANT CHANGE UP
T4 AB SER-ACNC: 3.27 UG/DL — LOW (ref 5.1–13)
T4 FREE SERPL-MCNC: 0.9 NG/DL — SIGNIFICANT CHANGE UP (ref 0.9–1.8)
TSH SERPL-MCNC: 5.79 UIU/ML — HIGH (ref 0.27–4.2)
WBC # BLD: 3.56 K/UL — LOW (ref 3.8–10.5)
WBC # FLD AUTO: 3.56 K/UL — LOW (ref 3.8–10.5)

## 2024-07-30 PROCEDURE — 99233 SBSQ HOSP IP/OBS HIGH 50: CPT | Mod: GC

## 2024-07-30 RX ORDER — ACETAMINOPHEN 500 MG
1000 TABLET ORAL ONCE
Refills: 0 | Status: COMPLETED | OUTPATIENT
Start: 2024-07-30 | End: 2024-07-30

## 2024-07-30 RX ORDER — REMDESIVIR 100 MG/1
100 INJECTION, POWDER, LYOPHILIZED, FOR SOLUTION INTRAVENOUS EVERY 24 HOURS
Refills: 0 | Status: DISCONTINUED | OUTPATIENT
Start: 2024-07-31 | End: 2024-08-01

## 2024-07-30 RX ORDER — REMDESIVIR 100 MG/1
INJECTION, POWDER, LYOPHILIZED, FOR SOLUTION INTRAVENOUS
Refills: 0 | Status: DISCONTINUED | OUTPATIENT
Start: 2024-07-30 | End: 2024-08-01

## 2024-07-30 RX ORDER — POTASSIUM CHLORIDE 1500 MG/1
40 TABLET, EXTENDED RELEASE ORAL EVERY 4 HOURS
Refills: 0 | Status: COMPLETED | OUTPATIENT
Start: 2024-07-30 | End: 2024-07-30

## 2024-07-30 RX ORDER — REMDESIVIR 100 MG/1
200 INJECTION, POWDER, LYOPHILIZED, FOR SOLUTION INTRAVENOUS EVERY 24 HOURS
Refills: 0 | Status: COMPLETED | OUTPATIENT
Start: 2024-07-30 | End: 2024-07-30

## 2024-07-30 RX ADMIN — Medication 81 MILLIGRAM(S): at 11:39

## 2024-07-30 RX ADMIN — Medication 0.4 MILLIGRAM(S): at 22:19

## 2024-07-30 RX ADMIN — PANTOPRAZOLE SODIUM 40 MILLIGRAM(S): 20 TABLET, DELAYED RELEASE ORAL at 05:58

## 2024-07-30 RX ADMIN — ENOXAPARIN SODIUM 40 MILLIGRAM(S): 120 INJECTION SUBCUTANEOUS at 05:58

## 2024-07-30 RX ADMIN — Medication 400 MILLIGRAM(S): at 09:26

## 2024-07-30 RX ADMIN — Medication 100 MILLIGRAM(S): at 11:39

## 2024-07-30 RX ADMIN — POTASSIUM CHLORIDE 40 MILLIEQUIVALENT(S): 1500 TABLET, EXTENDED RELEASE ORAL at 11:39

## 2024-07-30 RX ADMIN — POTASSIUM CHLORIDE 40 MILLIEQUIVALENT(S): 1500 TABLET, EXTENDED RELEASE ORAL at 14:52

## 2024-07-30 RX ADMIN — REMDESIVIR 200 MILLIGRAM(S): 100 INJECTION, POWDER, LYOPHILIZED, FOR SOLUTION INTRAVENOUS at 19:28

## 2024-07-30 NOTE — PROVIDER CONTACT NOTE (OTHER) - REASON
/46
bp 135/45
pt bp 127/48, pt lethargic, and disoriented
pt bp 133/48, pt 99.9 temp
pt bp 136/49, and is wheezing
/47

## 2024-07-30 NOTE — PROVIDER CONTACT NOTE (OTHER) - ACTION/TREATMENT ORDERED:
md notified. no interventions for the bp; iv tylenol ordered for temp
MD made aware, no new orders
provider aware. no further orders at this time
provider notified. no further actions placed at this time
MD made aware, no new orders
md notified. no further orders placed at this time; provider will come up to bedside to assess

## 2024-07-30 NOTE — PROVIDER CONTACT NOTE (OTHER) - SITUATION
/47
bp 135/45
pt bp 127/48, pt lethargic, and disoriented.
/46
pt bp 133/48, pt 99.9 temp
pt bp 136/49, and is wheezing

## 2024-07-30 NOTE — PROVIDER CONTACT NOTE (OTHER) - BACKGROUND
89yom admitting dx uti pmhx bph
89 yom admitting dx uti pmhx bph
89 y/o male admitted for tubuloinntersitial nephritis with hx of BPH, HTN, acute UTI
89yom admitting dx uti pyelonephritis, htn, bph
89yom admitting dx uti pyelonephritis, htn, bph
89 y/o male admitted for tubuloinntersitial nephritis with hx of BPH, HTN, acute UTI

## 2024-07-30 NOTE — PROGRESS NOTE ADULT - PROBLEM SELECTOR PLAN 3
-unknown baseline hgb  -no evidence of acute bleeding  -Ferritin and reticulocyte count   -transfuse for hgb <7 -unknown baseline hgb  -no evidence of acute bleeding  -F/u iron studies   -Retic ct wnl   -transfuse for hgb <7

## 2024-07-30 NOTE — PROGRESS NOTE ADULT - PROBLEM SELECTOR PLAN 4
-hold tamsulosin for now in the setting of soft BPs  -monitor UOP -C/w home tamsulosin   -Adequate UOP

## 2024-07-30 NOTE — PROVIDER CONTACT NOTE (OTHER) - ASSESSMENT
pt wheezing audible without stethoscope, o2 sat 100, negative retractions, cyanosis, finger clubbing, or signs of respiratory distress

## 2024-07-30 NOTE — PROVIDER CONTACT NOTE (OTHER) - DATE AND TIME:
29-Jul-2024 22:28
30-Jul-2024 11:56
30-Jul-2024 05:00
29-Jul-2024 10:26
29-Jul-2024 18:14
30-Jul-2024 09:11

## 2024-07-30 NOTE — PROGRESS NOTE ADULT - ATTENDING COMMENTS
89M with PMH of HTN, BPH presenting with fever. HX notable for recent urology visit and treatment for UTI. Despite tx w/ PO abx - symptoms did not improve. Pt presents for eval. On arrival - pt noted to be febrile/tachy w/ elevated lactate. UA grossly positive. CT AP showing signs consistent w/ cystitis. No renal abscess noted. Pt admitted for severe sepsis 2' E Coli UTI c/b E Coli bacteremia.       Pt seen and evaluated at bedside. Doing better today. No new change reported. No pain. On exam - NAD. Chest clear at the time of my exam. Report of wheeze on f/u by resident. Labs reviewed.      #Severe sepsis 2' E Coli UTI c/b E Coli bacteremia.  #COVID-19 PNA  #HTN  #BPH  #Lactic acidosis  #Anemia     -Reviewed OP records -> UCx showing E Coli. Sensitivities pending.  -BCx showing E Coli - sensitivities pending.  -Temp curve improved. Continue CTX.  -Given new resp sx -> RVP repeated - pt now found to be COVID positive. Remdesivir x3d. Iso per protocol. Defer on steroids for now 0 consider if pt is hypoxic.   -Cont tamsulosin.  -Trial off O2.   -PT recs home PT     Rest of plan as above.      ID: [id number]

## 2024-07-30 NOTE — PROGRESS NOTE ADULT - SUBJECTIVE AND OBJECTIVE BOX
***************************************************************  Shant Myles  (PGY1) Internal Medicine  On TEAMS  ***************************************************************    PROGRESS NOTE:     Patient is a 89y old  Male who presents with a chief complaint of Sepsis, UTI (29 Jul 2024 07:11)        INTERVAL EVENTS:   SUBJECTIVE / OVERNIGHT EVENTS: No acute overnight events. Patient was seen and examined by the bedside this AM.   OXYGEN:   TELEMETRY:       MEDICATIONS  (STANDING):  aspirin enteric coated 81 milliGRAM(s) Oral daily  cefTRIAXone   IVPB 2000 milliGRAM(s) IV Intermittent every 24 hours  enoxaparin Injectable 40 milliGRAM(s) SubCutaneous every 24 hours  pantoprazole    Tablet 40 milliGRAM(s) Oral before breakfast  potassium chloride   Powder 40 milliEquivalent(s) Oral every 4 hours  tamsulosin 0.4 milliGRAM(s) Oral at bedtime    MEDICATIONS  (PRN):  acetaminophen     Tablet .. 650 milliGRAM(s) Oral every 6 hours PRN Temp greater or equal to 38C (100.4F), Mild Pain (1 - 3)  aluminum hydroxide/magnesium hydroxide/simethicone Suspension 30 milliLiter(s) Oral every 4 hours PRN Dyspepsia  melatonin 3 milliGRAM(s) Oral at bedtime PRN Insomnia  ondansetron Injectable 4 milliGRAM(s) IV Push every 8 hours PRN Nausea and/or Vomiting      CAPILLARY BLOOD GLUCOSE        I&O's Summary      PHYSICAL EXAM:  Vital Signs Last 24 Hrs  T(C): 37.7 (30 Jul 2024 09:00), Max: 37.7 (30 Jul 2024 09:00)  T(F): 99.9 (30 Jul 2024 09:00), Max: 99.9 (30 Jul 2024 09:00)  HR: 88 (30 Jul 2024 09:00) (71 - 95)  BP: 133/48 (30 Jul 2024 09:00) (127/48 - 143/54)  BP(mean): --  RR: 18 (30 Jul 2024 09:00) (18 - 18)  SpO2: 99% (30 Jul 2024 09:00) (99% - 100%)    Parameters below as of 30 Jul 2024 09:00  Patient On (Oxygen Delivery Method): nasal cannula  O2 Flow (L/min): 2      PHYSICAL EXAM:  GENERAL: NAD, well-groomed, well-developed  HEAD:  Atraumatic, Normocephalic  EYES: EOMI, PERRLA, conjunctiva and sclera clear  ENMT: No tonsillar erythema, exudates, or enlargement; Moist mucous membranes, Good dentition, No lesions  NECK: Supple, No JVD, Normal thyroid  NERVOUS SYSTEM:  Alert & Oriented X3, Good concentration; Motor Strength 5/5 B/L upper and lower extremities; DTRs 2+ intact and symmetric  CHEST/LUNG: Clear to auscultation bilaterally; No rales, rhonchi, wheezing, or rubs  HEART: Regular rate and rhythm; No murmurs, rubs, or gallops  ABDOMEN: Soft, Nontender, Nondistended; Bowel sounds present  EXTREMITIES:  2+ Peripheral Pulses, No clubbing, cyanosis, or edema  LYMPH: No lymphadenopathy noted  SKIN: No rashes or lesions    LABS:                        8.1    3.56  )-----------( 260      ( 30 Jul 2024 06:08 )             26.6     07-30    139  |  106  |  14  ----------------------------<  91  3.4<L>   |  22  |  0.92    Ca    8.3<L>      30 Jul 2024 06:08  Phos  2.6     07-30  Mg     1.90     07-30    TPro  6.2  /  Alb  3.3  /  TBili  0.5  /  DBili  x   /  AST  18  /  ALT  15  /  AlkPhos  61  07-30    PT/INR - ( 28 Jul 2024 18:44 )   PT: 13.5 sec;   INR: 1.21 ratio         PTT - ( 28 Jul 2024 18:44 )  PTT:28.7 sec      Urinalysis Basic - ( 30 Jul 2024 06:08 )    Color: x / Appearance: x / SG: x / pH: x  Gluc: 91 mg/dL / Ketone: x  / Bili: x / Urobili: x   Blood: x / Protein: x / Nitrite: x   Leuk Esterase: x / RBC: x / WBC x   Sq Epi: x / Non Sq Epi: x / Bacteria: x        Culture - Urine (collected 29 Jul 2024 08:52)  Source: Clean Catch Clean Catch (Midstream)  Final Report (30 Jul 2024 08:22):    <10,000 CFU/mL Normal Urogenital Gloria    Urinalysis with Rflx Culture (collected 28 Jul 2024 21:54)    Culture - Blood (collected 28 Jul 2024 19:00)  Source: .Blood Blood-Peripheral  Gram Stain (29 Jul 2024 13:05):    Growth in anaerobic bottle: Gram Negative Rods  Preliminary Report (29 Jul 2024 13:05):    Growth in anaerobic bottle: Gram Negative Rods    Culture - Blood (collected 28 Jul 2024 18:45)  Source: .Blood Blood-Peripheral  Gram Stain (29 Jul 2024 11:54):    Growth in anaerobic bottle: Gram Negative Rods  Preliminary Report (29 Jul 2024 11:55):    Growth in anaerobic bottle: Gram Negative Rods    Direct identification is available within approximately 3-5    hours either by Blood Panel Multiplexed PCR or Direct    MALDI-TOF. Details: [email or web address]  Organism: Blood Culture PCR (29 Jul 2024 13:49)  Organism: Blood Culture PCR (29 Jul 2024 13:49)        COORDINATION OF CARE:  Care Discussed with Consultants/Other Providers [Y/N]:  Prior or Outpatient Records Reviewed [Y/N]: ***************************************************************  Shant Myles  (PGY1) Internal Medicine  On TEAMS  ***************************************************************    PROGRESS NOTE:     Patient is a 89y old  Male who presents with a chief complaint of Sepsis, UTI (29 Jul 2024 07:11)        INTERVAL EVENTS:   SUBJECTIVE / OVERNIGHT EVENTS: No acute overnight events. Patient was seen and examined by the bedside this AM. Patient mentions feeling better, denies fevers or chills or pain. Is able to concentrate better than the day prior. Otherwise no other complaints.     MEDICATIONS  (STANDING):  aspirin enteric coated 81 milliGRAM(s) Oral daily  cefTRIAXone   IVPB 2000 milliGRAM(s) IV Intermittent every 24 hours  enoxaparin Injectable 40 milliGRAM(s) SubCutaneous every 24 hours  pantoprazole    Tablet 40 milliGRAM(s) Oral before breakfast  potassium chloride   Powder 40 milliEquivalent(s) Oral every 4 hours  tamsulosin 0.4 milliGRAM(s) Oral at bedtime    MEDICATIONS  (PRN):  acetaminophen     Tablet .. 650 milliGRAM(s) Oral every 6 hours PRN Temp greater or equal to 38C (100.4F), Mild Pain (1 - 3)  aluminum hydroxide/magnesium hydroxide/simethicone Suspension 30 milliLiter(s) Oral every 4 hours PRN Dyspepsia  melatonin 3 milliGRAM(s) Oral at bedtime PRN Insomnia  ondansetron Injectable 4 milliGRAM(s) IV Push every 8 hours PRN Nausea and/or Vomiting      CAPILLARY BLOOD GLUCOSE        I&O's Summary      PHYSICAL EXAM:  Vital Signs Last 24 Hrs  T(C): 37.7 (30 Jul 2024 09:00), Max: 37.7 (30 Jul 2024 09:00)  T(F): 99.9 (30 Jul 2024 09:00), Max: 99.9 (30 Jul 2024 09:00)  HR: 88 (30 Jul 2024 09:00) (71 - 95)  BP: 133/48 (30 Jul 2024 09:00) (127/48 - 143/54)  BP(mean): --  RR: 18 (30 Jul 2024 09:00) (18 - 18)  SpO2: 99% (30 Jul 2024 09:00) (99% - 100%)    Parameters below as of 30 Jul 2024 09:00  Patient On (Oxygen Delivery Method): nasal cannula  O2 Flow (L/min): 2      PHYSICAL EXAM:  GENERAL: NAD  HEAD:  Atraumatic, Normocephalic  EYES: EOMI, PERRLA, conjunctiva and sclera clear  NERVOUS SYSTEM:  Alert & Oriented X2, no oriented to time, says year is 1964. Good concentration   CHEST/LUNG: Clear to auscultation bilaterally; No rales, rhonchi, wheezing, or rubs  HEART: Regular rate and rhythm; No murmurs, rubs, or gallops  ABDOMEN: Soft, Nontender, Nondistended; Bowel sounds present  EXTREMITIES:  2+ Peripheral Pulses, No clubbing, cyanosis, or edema  SKIN: Warm, well perfused. <1 second capillary refill     LABS:                        8.1    3.56  )-----------( 260      ( 30 Jul 2024 06:08 )             26.6     07-30    139  |  106  |  14  ----------------------------<  91  3.4<L>   |  22  |  0.92    Ca    8.3<L>      30 Jul 2024 06:08  Phos  2.6     07-30  Mg     1.90     07-30    TPro  6.2  /  Alb  3.3  /  TBili  0.5  /  DBili  x   /  AST  18  /  ALT  15  /  AlkPhos  61  07-30    PT/INR - ( 28 Jul 2024 18:44 )   PT: 13.5 sec;   INR: 1.21 ratio         PTT - ( 28 Jul 2024 18:44 )  PTT:28.7 sec      Urinalysis Basic - ( 30 Jul 2024 06:08 )    Color: x / Appearance: x / SG: x / pH: x  Gluc: 91 mg/dL / Ketone: x  / Bili: x / Urobili: x   Blood: x / Protein: x / Nitrite: x   Leuk Esterase: x / RBC: x / WBC x   Sq Epi: x / Non Sq Epi: x / Bacteria: x        Culture - Urine (collected 29 Jul 2024 08:52)  Source: Clean Catch Clean Catch (Midstream)  Final Report (30 Jul 2024 08:22):    <10,000 CFU/mL Normal Urogenital Gloria    Urinalysis with Rflx Culture (collected 28 Jul 2024 21:54)    Culture - Blood (collected 28 Jul 2024 19:00)  Source: .Blood Blood-Peripheral  Gram Stain (29 Jul 2024 13:05):    Growth in anaerobic bottle: Gram Negative Rods  Preliminary Report (29 Jul 2024 13:05):    Growth in anaerobic bottle: Gram Negative Rods    Culture - Blood (collected 28 Jul 2024 18:45)  Source: .Blood Blood-Peripheral  Gram Stain (29 Jul 2024 11:54):    Growth in anaerobic bottle: Gram Negative Rods  Preliminary Report (29 Jul 2024 11:55):    Growth in anaerobic bottle: Gram Negative Rods    Direct identification is available within approximately 3-5    hours either by Blood Panel Multiplexed PCR or Direct    MALDI-TOF. Details: [email or web address]  Organism: Blood Culture PCR (29 Jul 2024 13:49)  Organism: Blood Culture PCR (29 Jul 2024 13:49)        COORDINATION OF CARE:  Care Discussed with Consultants/Other Providers [Y/N]:  Prior or Outpatient Records Reviewed [Y/N]:

## 2024-07-30 NOTE — PROGRESS NOTE ADULT - PROBLEM SELECTOR PLAN 5
TSH elevated, likely in the setting of severe sepsis, but patient could benefit from outpatient f/u    -CTM TSH elevated, likely in the setting of severe sepsis, but patient could benefit from outpatient f/u    -T4 decreased and TSH increased*** TSH elevated, likely in the setting of severe sepsis, but patient could benefit from outpatient f/u    -T4 decreased and TSH increased, likely iso sepsis

## 2024-07-30 NOTE — PROVIDER CONTACT NOTE (OTHER) - ASSESSMENT
/47 T 98 HR 79 RR 18 oxygen sat %; not in distress, alert and responsive /47 T 98 HR 79 RR 18 oxygen sat 2L %; not in distress, alert and responsive

## 2024-07-30 NOTE — PROGRESS NOTE ADULT - PROBLEM SELECTOR PLAN 1
Pt p/w tmax 103, history + UA. UA currently with no bacteria, likely because patient on antibiotics. given pt meets criteria for severe sepsis (lactic acidosis, mild hypotension) and with recent hospitalization, treat with zosyn and vanc x1    -f/u urine cultures done as outpatient (urologist Dr. Daniel Norton, part of [facility])  -f/u inpatient urine cx and blood cx  -f/u mrsa swab  -CTAP not concerning for pyelonephritis   -if pt continues to spike fevers or has HD instability, broaden to meropenem   -CXR negative   -Switch from Zosyn to Ceftriaxone Pt p/w tmax 103, history + UA. UA currently with no bacteria, likely because patient on antibiotics. given pt meets criteria for severe sepsis (lactic acidosis, mild hypotension) and with recent hospitalization, treat with zosyn and vanc x1    -f/u urine cultures done as outpatient (urologist Dr. Daniel Norton, part of [facility])  -Bcx growing E. Coli in both bottles; pending sensitivities   -f/u mrsa swab  -Increase to 2g ceftriaxone

## 2024-07-31 DIAGNOSIS — U07.1 COVID-19: ICD-10-CM

## 2024-07-31 LAB
-  AMPICILLIN/SULBACTAM: SIGNIFICANT CHANGE UP
-  AMPICILLIN: SIGNIFICANT CHANGE UP
-  AZTREONAM: SIGNIFICANT CHANGE UP
-  CEFAZOLIN: SIGNIFICANT CHANGE UP
-  CEFEPIME: SIGNIFICANT CHANGE UP
-  CEFOXITIN: SIGNIFICANT CHANGE UP
-  CEFTRIAXONE: SIGNIFICANT CHANGE UP
-  CIPROFLOXACIN: SIGNIFICANT CHANGE UP
-  ERTAPENEM: SIGNIFICANT CHANGE UP
-  GENTAMICIN: SIGNIFICANT CHANGE UP
-  IMIPENEM: SIGNIFICANT CHANGE UP
-  LEVOFLOXACIN: SIGNIFICANT CHANGE UP
-  MEROPENEM: SIGNIFICANT CHANGE UP
-  PIPERACILLIN/TAZOBACTAM: SIGNIFICANT CHANGE UP
-  TOBRAMYCIN: SIGNIFICANT CHANGE UP
-  TRIMETHOPRIM/SULFAMETHOXAZOLE: SIGNIFICANT CHANGE UP
ALBUMIN SERPL ELPH-MCNC: 3.2 G/DL — LOW (ref 3.3–5)
ALP SERPL-CCNC: 60 U/L — SIGNIFICANT CHANGE UP (ref 40–120)
ALT FLD-CCNC: 11 U/L — SIGNIFICANT CHANGE UP (ref 4–41)
ANION GAP SERPL CALC-SCNC: 11 MMOL/L — SIGNIFICANT CHANGE UP (ref 7–14)
AST SERPL-CCNC: 18 U/L — SIGNIFICANT CHANGE UP (ref 4–40)
BACTERIA UR CULT: ABNORMAL
BASOPHILS # BLD AUTO: 0.02 K/UL — SIGNIFICANT CHANGE UP (ref 0–0.2)
BASOPHILS NFR BLD AUTO: 0.7 % — SIGNIFICANT CHANGE UP (ref 0–2)
BILIRUB SERPL-MCNC: 0.3 MG/DL — SIGNIFICANT CHANGE UP (ref 0.2–1.2)
BUN SERPL-MCNC: 12 MG/DL — SIGNIFICANT CHANGE UP (ref 7–23)
CALCIUM SERPL-MCNC: 8.2 MG/DL — LOW (ref 8.4–10.5)
CHLORIDE SERPL-SCNC: 106 MMOL/L — SIGNIFICANT CHANGE UP (ref 98–107)
CO2 SERPL-SCNC: 23 MMOL/L — SIGNIFICANT CHANGE UP (ref 22–31)
CREAT SERPL-MCNC: 0.85 MG/DL — SIGNIFICANT CHANGE UP (ref 0.5–1.3)
CULTURE RESULTS: ABNORMAL
CULTURE RESULTS: ABNORMAL
EGFR: 82 ML/MIN/1.73M2 — SIGNIFICANT CHANGE UP
EOSINOPHIL # BLD AUTO: 0.19 K/UL — SIGNIFICANT CHANGE UP (ref 0–0.5)
EOSINOPHIL NFR BLD AUTO: 6.4 % — HIGH (ref 0–6)
GLUCOSE SERPL-MCNC: 91 MG/DL — SIGNIFICANT CHANGE UP (ref 70–99)
HCT VFR BLD CALC: 25.9 % — LOW (ref 39–50)
HGB BLD-MCNC: 8 G/DL — LOW (ref 13–17)
IANC: 1.94 K/UL — SIGNIFICANT CHANGE UP (ref 1.8–7.4)
IMM GRANULOCYTES NFR BLD AUTO: 1.7 % — HIGH (ref 0–0.9)
LYMPHOCYTES # BLD AUTO: 0.44 K/UL — LOW (ref 1–3.3)
LYMPHOCYTES # BLD AUTO: 14.9 % — SIGNIFICANT CHANGE UP (ref 13–44)
MAGNESIUM SERPL-MCNC: 2 MG/DL — SIGNIFICANT CHANGE UP (ref 1.6–2.6)
MCHC RBC-ENTMCNC: 28.1 PG — SIGNIFICANT CHANGE UP (ref 27–34)
MCHC RBC-ENTMCNC: 30.9 GM/DL — LOW (ref 32–36)
MCV RBC AUTO: 90.9 FL — SIGNIFICANT CHANGE UP (ref 80–100)
METHOD TYPE: SIGNIFICANT CHANGE UP
MONOCYTES # BLD AUTO: 0.32 K/UL — SIGNIFICANT CHANGE UP (ref 0–0.9)
MONOCYTES NFR BLD AUTO: 10.8 % — SIGNIFICANT CHANGE UP (ref 2–14)
NEUTROPHILS # BLD AUTO: 1.94 K/UL — SIGNIFICANT CHANGE UP (ref 1.8–7.4)
NEUTROPHILS NFR BLD AUTO: 65.5 % — SIGNIFICANT CHANGE UP (ref 43–77)
NRBC # BLD: 0 /100 WBCS — SIGNIFICANT CHANGE UP (ref 0–0)
NRBC # FLD: 0 K/UL — SIGNIFICANT CHANGE UP (ref 0–0)
ORGANISM # SPEC MICROSCOPIC CNT: ABNORMAL
PHOSPHATE SERPL-MCNC: 2.3 MG/DL — LOW (ref 2.5–4.5)
PLATELET # BLD AUTO: 292 K/UL — SIGNIFICANT CHANGE UP (ref 150–400)
POTASSIUM SERPL-MCNC: 4 MMOL/L — SIGNIFICANT CHANGE UP (ref 3.5–5.3)
POTASSIUM SERPL-SCNC: 4 MMOL/L — SIGNIFICANT CHANGE UP (ref 3.5–5.3)
PROT SERPL-MCNC: 6.2 G/DL — SIGNIFICANT CHANGE UP (ref 6–8.3)
RBC # BLD: 2.85 M/UL — LOW (ref 4.2–5.8)
RBC # FLD: 16 % — HIGH (ref 10.3–14.5)
SODIUM SERPL-SCNC: 140 MMOL/L — SIGNIFICANT CHANGE UP (ref 135–145)
SPECIMEN SOURCE: SIGNIFICANT CHANGE UP
SPECIMEN SOURCE: SIGNIFICANT CHANGE UP
WBC # BLD: 2.96 K/UL — LOW (ref 3.8–10.5)
WBC # FLD AUTO: 2.96 K/UL — LOW (ref 3.8–10.5)

## 2024-07-31 PROCEDURE — 99232 SBSQ HOSP IP/OBS MODERATE 35: CPT | Mod: GC

## 2024-07-31 RX ORDER — SOD PHOS DI, MONO/K PHOS MONO 250 MG
1 TABLET ORAL ONCE
Refills: 0 | Status: COMPLETED | OUTPATIENT
Start: 2024-07-31 | End: 2024-07-31

## 2024-07-31 RX ORDER — IPRATROPIUM BROMIDE AND ALBUTEROL SULFATE 2.5; .5 MG/3ML; MG/3ML
3 SOLUTION RESPIRATORY (INHALATION) EVERY 6 HOURS
Refills: 0 | Status: DISCONTINUED | OUTPATIENT
Start: 2024-07-31 | End: 2024-08-01

## 2024-07-31 RX ADMIN — Medication 81 MILLIGRAM(S): at 11:12

## 2024-07-31 RX ADMIN — Medication 1 PACKET(S): at 10:58

## 2024-07-31 RX ADMIN — PANTOPRAZOLE SODIUM 40 MILLIGRAM(S): 20 TABLET, DELAYED RELEASE ORAL at 06:28

## 2024-07-31 RX ADMIN — ENOXAPARIN SODIUM 40 MILLIGRAM(S): 120 INJECTION SUBCUTANEOUS at 06:29

## 2024-07-31 RX ADMIN — Medication 100 MILLIGRAM(S): at 10:58

## 2024-07-31 RX ADMIN — REMDESIVIR 200 MILLIGRAM(S): 100 INJECTION, POWDER, LYOPHILIZED, FOR SOLUTION INTRAVENOUS at 16:59

## 2024-07-31 RX ADMIN — Medication 0.4 MILLIGRAM(S): at 21:29

## 2024-07-31 NOTE — PROGRESS NOTE ADULT - PROBLEM SELECTOR PLAN 2
-hold amlodipine in the setting of soft BPs Patient is COVID positive, wife also tested positive. Patient appears symptomatic, no sore throat or runny nose; however COVID infection might be a component of overall clinical picture given his decreasing white count and declining mental status.     - Patient started on 5 day course of Remdesivir on 07/30. LFT's are wnl. C/w remdesivir

## 2024-07-31 NOTE — PROGRESS NOTE ADULT - SUBJECTIVE AND OBJECTIVE BOX
***************************************************************  Shant Myles  (PGY1) Internal Medicine  On TEAMS  ***************************************************************    PROGRESS NOTE:     Patient is a 89y old  Male who presents with a chief complaint of rigors (30 Jul 2024 09:31)        INTERVAL EVENTS:   SUBJECTIVE / OVERNIGHT EVENTS: No acute overnight events. Patient was seen and examined by the bedside this AM. Patient is feeling well, not complaining of shortness of breath, sore throat, runny nose, fevers or chills. Reports feeling better; off nasal cannula. Otherwise no complaints.     MEDICATIONS  (STANDING):  aspirin enteric coated 81 milliGRAM(s) Oral daily  cefTRIAXone   IVPB 2000 milliGRAM(s) IV Intermittent every 24 hours  enoxaparin Injectable 40 milliGRAM(s) SubCutaneous every 24 hours  pantoprazole    Tablet 40 milliGRAM(s) Oral before breakfast  potassium phosphate / sodium phosphate Powder (PHOS-NaK) 1 Packet(s) Oral once  remdesivir  IVPB   IV Intermittent   remdesivir  IVPB 100 milliGRAM(s) IV Intermittent every 24 hours  tamsulosin 0.4 milliGRAM(s) Oral at bedtime    MEDICATIONS  (PRN):  acetaminophen     Tablet .. 650 milliGRAM(s) Oral every 6 hours PRN Temp greater or equal to 38C (100.4F), Mild Pain (1 - 3)  aluminum hydroxide/magnesium hydroxide/simethicone Suspension 30 milliLiter(s) Oral every 4 hours PRN Dyspepsia  melatonin 3 milliGRAM(s) Oral at bedtime PRN Insomnia  ondansetron Injectable 4 milliGRAM(s) IV Push every 8 hours PRN Nausea and/or Vomiting      CAPILLARY BLOOD GLUCOSE        I&O's Summary      PHYSICAL EXAM:  Vital Signs Last 24 Hrs  T(C): 36.9 (31 Jul 2024 06:15), Max: 37.1 (30 Jul 2024 21:49)  T(F): 98.5 (31 Jul 2024 06:15), Max: 98.7 (30 Jul 2024 21:49)  HR: 80 (31 Jul 2024 06:15) (73 - 93)  BP: 130/65 (31 Jul 2024 06:15) (122/55 - 151/68)  BP(mean): --  RR: 18 (31 Jul 2024 06:15) (18 - 18)  SpO2: 95% (31 Jul 2024 06:15) (95% - 100%)    Parameters below as of 31 Jul 2024 06:15  Patient On (Oxygen Delivery Method): room air        PHYSICAL EXAM:  GENERAL: NAD  HEAD:  Atraumatic, Normocephalic  EYES: EOMI, PERRLA, conjunctiva and sclera clear  NERVOUS SYSTEM:  Alert & Oriented X1, not oriented to place or time.   CHEST/LUNG: Diffuse wheezing heard on auscultation bilaterally   HEART: Regular rate and rhythm; No murmurs, rubs, or gallops  ABDOMEN: Soft, Nontender, Nondistended; Bowel sounds present  EXTREMITIES:  2+ Peripheral Pulses, No clubbing, cyanosis, or edema  LYMPH: No lymphadenopathy noted  SKIN: No rashes or lesions    LABS:                        8.0    2.96  )-----------( 292      ( 31 Jul 2024 06:17 )             25.9     07-31    140  |  106  |  12  ----------------------------<  91  4.0   |  23  |  0.85    Ca    8.2<L>      31 Jul 2024 06:17  Phos  2.3     07-31  Mg     2.00     07-31    TPro  6.2  /  Alb  3.2<L>  /  TBili  0.3  /  DBili  x   /  AST  18  /  ALT  11  /  AlkPhos  60  07-31          Urinalysis Basic - ( 31 Jul 2024 06:17 )    Color: x / Appearance: x / SG: x / pH: x  Gluc: 91 mg/dL / Ketone: x  / Bili: x / Urobili: x   Blood: x / Protein: x / Nitrite: x   Leuk Esterase: x / RBC: x / WBC x   Sq Epi: x / Non Sq Epi: x / Bacteria: x        Culture - Urine (collected 29 Jul 2024 08:52)  Source: Clean Catch Clean Catch (Midstream)  Final Report (30 Jul 2024 08:22):    <10,000 CFU/mL Normal Urogenital Gloria    Urinalysis with Rflx Culture (collected 28 Jul 2024 21:54)    Culture - Blood (collected 28 Jul 2024 19:00)  Source: .Blood Blood-Peripheral  Gram Stain (29 Jul 2024 13:05):    Growth in anaerobic bottle: Gram Negative Rods  Final Report (31 Jul 2024 08:13):    Growth in anaerobic bottle: Escherichia coli See previous culture    [id number]    Culture - Blood (collected 28 Jul 2024 18:45)  Source: .Blood Blood-Peripheral  Gram Stain (29 Jul 2024 11:54):    Growth in anaerobic bottle: Gram Negative Rods  Final Report (31 Jul 2024 08:12):    Growth in anaerobic bottle: Escherichia coli    Direct identification is available within approximately 3-5    hours either by Blood Panel Multiplexed PCR or Direct    MALDI-TOF. Details: [email or web address]  Organism: Blood Culture PCR  Escherichia coli (31 Jul 2024 08:13)  Organism: Escherichia coli (31 Jul 2024 08:13)  Organism: Blood Culture PCR (31 Jul 2024 08:13)        COORDINATION OF CARE:  Care Discussed with Consultants/Other Providers [Y/N]:  Prior or Outpatient Records Reviewed [Y/N]:

## 2024-07-31 NOTE — PROGRESS NOTE ADULT - PROBLEM SELECTOR PLAN 4
-C/w home tamsulosin   -Adequate UOP -unknown baseline hgb  -no evidence of acute bleeding  -Retic ct wnl   -transfuse for hgb <7

## 2024-07-31 NOTE — PROGRESS NOTE ADULT - CONVERSATION DETAILS
Spoke with son Franky and patient at bedside. Discussed current hospitalization and current course. All relevant findings and treatment reviewed. Per son - pt does not currently have any advance directives. He states pt is full code at this time. Further conversation of GOC and advance directives will be deferred until after pt returns home. Joint decision making is between patient and his wife. WIfe is currently at home battling COVID as well.

## 2024-07-31 NOTE — PROGRESS NOTE ADULT - PROBLEM SELECTOR PLAN 3
-unknown baseline hgb  -no evidence of acute bleeding  -F/u iron studies   -Retic ct wnl   -transfuse for hgb <7 -hold amlodipine in the setting of soft BPs

## 2024-07-31 NOTE — PROGRESS NOTE ADULT - PROBLEM SELECTOR PLAN 1
Pt p/w tmax 103, history + UA. UA currently with no bacteria, likely because patient on antibiotics. given pt meets criteria for severe sepsis (lactic acidosis, mild hypotension) and with recent hospitalization, treat with zosyn and vanc x1    -f/u urine cultures done as outpatient (urologist Dr. Daniel Norton, part of [facility])  -Bcx growing E. Coli in both bottles, sensitive to ceftriaxone   -C/w ceftriaxone  - White count downtrending Pt p/w tmax 103, history + UA. UA currently with no bacteria, likely because patient on antibiotics. given pt meets criteria for severe sepsis (lactic acidosis, mild hypotension) and with recent hospitalization, treat with zosyn and vanc x1    -f/u urine cultures done as outpatient (urologist Dr. Daniel Norton, part of [facility])  -Bcx growing E. Coli in both bottles, sensitive to ceftriaxone   -C/w ceftriaxone  -White count downtrending, continue to trend

## 2024-07-31 NOTE — PROGRESS NOTE ADULT - PROBLEM SELECTOR PLAN 5
TSH elevated, likely in the setting of severe sepsis, but patient could benefit from outpatient f/u    -T4 decreased and TSH increased, likely iso sepsis -C/w home tamsulosin   -Adequate UOP

## 2024-07-31 NOTE — PROGRESS NOTE ADULT - ATTENDING COMMENTS
89M with PMH of HTN, BPH presenting with fever. HX notable for recent urology visit and treatment for UTI. Despite tx w/ PO abx - symptoms did not improve. Pt presents for eval. On arrival - pt noted to be febrile/tachy w/ elevated lactate. UA grossly positive. CT AP showing signs consistent w/ cystitis. No renal abscess noted. Pt admitted for severe sepsis 2' E Coli UTI c/b E Coli bacteremia.    Pt seen and evaluated at bedside. Toby Wilkins present at bedside. States pt appears well. Pt had no complaints. Doing well on RA. Reports cough. No SOB/CP/palps. No wheezing. On exam -in bed, NAD. Chest clear - no sig wheezes heard. Abd soft, NT, ND, +BSx4. Remainder of exam unchanged. Labs w/ downtrending WBC, but otherwise stable.    #Severe sepsis 2' E Coli UTI c/b E Coli bacteremia.  #COVID-19 PNA  #HTN  #BPH  #Lactic acidosis  #Anemia     -Afeb. Clinically improving. Sepsis resolved. There appears to be source control. BCx showing E Coli, sensitivities reviewed.  -Continue CTX for now. On DC -> can convert to PO abx.   -Continue remdesvir for now - can plan for 3d course for now. Hold off steroids. Pt is not hypoxic. Bronchospasms resolved w/ neb.  -Cont tamsulosin.  -PT recs home PT  -DC in next 24-48h pending course.     Rest of plan as above. 89M with PMH of HTN, BPH presenting with fever. HX notable for recent urology visit and treatment for UTI. Despite tx w/ PO abx - symptoms did not improve. Pt presents for eval. On arrival - pt noted to be febrile/tachy w/ elevated lactate. UA grossly positive. CT AP showing signs consistent w/ cystitis. No renal abscess noted. Pt admitted for severe sepsis 2' E Coli UTI c/b E Coli bacteremia.    Pt seen and evaluated at bedside. Son Franky present at bedside. States pt appears well. Pt had no complaints. Doing well on RA. Reports cough. No SOB/CP/palps. No wheezing. On exam -in bed, NAD. Chest clear - no sig wheezes heard. Abd soft, NT, ND, +BSx4. Remainder of exam unchanged. Labs w/ downtrending WBC, but otherwise stable.    #Severe sepsis 2' E Coli UTI c/b E Coli bacteremia.  #COVID-19 PNA  #HTN  #BPH  #Lactic acidosis  #Anemia     -Afeb. Clinically improving. Sepsis resolved. There appears to be source control. BCx showing E Coli, sensitivities reviewed.  -Continue CTX for now. On DC -> can convert to PO abx.   -Continue remdesvir for now - can plan for 3d course for now. Hold off steroids. Pt is not hypoxic. Bronchospasms resolved w/ neb.  -Cont tamsulosin.  -PT recs home PT  -Full code as per [facility] discussion w/ son and pt today.  -DC in next 24-48h pending course.      Rest of plan as above.

## 2024-07-31 NOTE — PROGRESS NOTE ADULT - PROBLEM SELECTOR PLAN 6
- Fluids: None  - Electrolytes: Will replete to maintain K>4, Phos>3, and Mag>2  - Nutrition: DASH   - Activity: As tolerated   - DVT Prophylaxis: Lovenox   - Disposition: F/u PT recs TSH elevated, likely in the setting of severe sepsis, but patient could benefit from outpatient f/u    -T4 decreased and TSH increased, likely iso sepsis TSH elevated, likely in the setting of severe sepsis, but patient could benefit from outpatient f/u    -FT4 WNL and TSH increased, likely iso sepsis

## 2024-08-01 ENCOUNTER — TRANSCRIPTION ENCOUNTER (OUTPATIENT)
Age: 89
End: 2024-08-01

## 2024-08-01 VITALS
DIASTOLIC BLOOD PRESSURE: 62 MMHG | TEMPERATURE: 98 F | SYSTOLIC BLOOD PRESSURE: 143 MMHG | RESPIRATION RATE: 18 BRPM | OXYGEN SATURATION: 98 % | HEART RATE: 86 BPM

## 2024-08-01 LAB
ANION GAP SERPL CALC-SCNC: 11 MMOL/L — SIGNIFICANT CHANGE UP (ref 7–14)
BASOPHILS # BLD AUTO: 0 K/UL — SIGNIFICANT CHANGE UP (ref 0–0.2)
BASOPHILS NFR BLD AUTO: 0 % — SIGNIFICANT CHANGE UP (ref 0–2)
BUN SERPL-MCNC: 14 MG/DL — SIGNIFICANT CHANGE UP (ref 7–23)
CALCIUM SERPL-MCNC: 8 MG/DL — LOW (ref 8.4–10.5)
CHLORIDE SERPL-SCNC: 103 MMOL/L — SIGNIFICANT CHANGE UP (ref 98–107)
CO2 SERPL-SCNC: 24 MMOL/L — SIGNIFICANT CHANGE UP (ref 22–31)
CREAT SERPL-MCNC: 0.8 MG/DL — SIGNIFICANT CHANGE UP (ref 0.5–1.3)
EGFR: 84 ML/MIN/1.73M2 — SIGNIFICANT CHANGE UP
EOSINOPHIL # BLD AUTO: 0.46 K/UL — SIGNIFICANT CHANGE UP (ref 0–0.5)
EOSINOPHIL NFR BLD AUTO: 12.4 % — HIGH (ref 0–6)
GLUCOSE SERPL-MCNC: 88 MG/DL — SIGNIFICANT CHANGE UP (ref 70–99)
HCT VFR BLD CALC: 24.9 % — LOW (ref 39–50)
HGB BLD-MCNC: 7.9 G/DL — LOW (ref 13–17)
IANC: 2.15 K/UL — SIGNIFICANT CHANGE UP (ref 1.8–7.4)
LYMPHOCYTES # BLD AUTO: 0.36 K/UL — LOW (ref 1–3.3)
LYMPHOCYTES # BLD AUTO: 9.7 % — LOW (ref 13–44)
MAGNESIUM SERPL-MCNC: 2 MG/DL — SIGNIFICANT CHANGE UP (ref 1.6–2.6)
MCHC RBC-ENTMCNC: 28.3 PG — SIGNIFICANT CHANGE UP (ref 27–34)
MCHC RBC-ENTMCNC: 31.7 GM/DL — LOW (ref 32–36)
MCV RBC AUTO: 89.2 FL — SIGNIFICANT CHANGE UP (ref 80–100)
MONOCYTES # BLD AUTO: 0.23 K/UL — SIGNIFICANT CHANGE UP (ref 0–0.9)
MONOCYTES NFR BLD AUTO: 6.2 % — SIGNIFICANT CHANGE UP (ref 2–14)
NEUTROPHILS # BLD AUTO: 2.62 K/UL — SIGNIFICANT CHANGE UP (ref 1.8–7.4)
NEUTROPHILS NFR BLD AUTO: 62.8 % — SIGNIFICANT CHANGE UP (ref 43–77)
PHOSPHATE SERPL-MCNC: 3.1 MG/DL — SIGNIFICANT CHANGE UP (ref 2.5–4.5)
PLATELET # BLD AUTO: 354 K/UL — SIGNIFICANT CHANGE UP (ref 150–400)
POTASSIUM SERPL-MCNC: 3.7 MMOL/L — SIGNIFICANT CHANGE UP (ref 3.5–5.3)
POTASSIUM SERPL-SCNC: 3.7 MMOL/L — SIGNIFICANT CHANGE UP (ref 3.5–5.3)
RBC # BLD: 2.79 M/UL — LOW (ref 4.2–5.8)
RBC # FLD: 16.1 % — HIGH (ref 10.3–14.5)
SODIUM SERPL-SCNC: 138 MMOL/L — SIGNIFICANT CHANGE UP (ref 135–145)
WBC # BLD: 3.7 K/UL — LOW (ref 3.8–10.5)
WBC # FLD AUTO: 3.7 K/UL — LOW (ref 3.8–10.5)

## 2024-08-01 PROCEDURE — 99239 HOSP IP/OBS DSCHRG MGMT >30: CPT | Mod: GC

## 2024-08-01 RX ORDER — POTASSIUM CHLORIDE 1500 MG/1
40 TABLET, EXTENDED RELEASE ORAL ONCE
Refills: 0 | Status: COMPLETED | OUTPATIENT
Start: 2024-08-01 | End: 2024-08-01

## 2024-08-01 RX ADMIN — Medication 100 MILLIGRAM(S): at 11:15

## 2024-08-01 RX ADMIN — ENOXAPARIN SODIUM 40 MILLIGRAM(S): 120 INJECTION SUBCUTANEOUS at 06:08

## 2024-08-01 RX ADMIN — POTASSIUM CHLORIDE 40 MILLIEQUIVALENT(S): 1500 TABLET, EXTENDED RELEASE ORAL at 10:36

## 2024-08-01 RX ADMIN — PANTOPRAZOLE SODIUM 40 MILLIGRAM(S): 20 TABLET, DELAYED RELEASE ORAL at 05:31

## 2024-08-01 RX ADMIN — Medication 81 MILLIGRAM(S): at 11:15

## 2024-08-01 NOTE — DISCHARGE NOTE NURSING/CASE MANAGEMENT/SOCIAL WORK - NSDCFUADDAPPT_GEN_ALL_CORE_FT
APPTS ARE READY TO BE MADE: [x] YES    Additional Information about above appointments (if needed):    1: Dr. Norton  2: PCP    If you need a PCP, or want a new one please make an appointment with General Internal Medicine, [address] [place] [address], [address], [place] [address]. Please call [phone] to make an appointment

## 2024-08-01 NOTE — DISCHARGE NOTE PROVIDER - NSDCMRMEDTOKEN_GEN_ALL_CORE_FT
amLODIPine 5 mg oral tablet: 1 tab(s) orally once a day  amoxicillin-clavulanate 875 mg-125 mg oral tablet: 875 milligram(s) orally 2 times a day Take one pill, twice a day (once in the morning and once in the evening) for 7 days. Finish on August 8  aspirin 81 mg oral tablet: 1 tab(s) orally once a day  tamsulosin 0.4 mg oral capsule: 1 cap(s) orally once a day (at bedtime)

## 2024-08-01 NOTE — DISCHARGE NOTE PROVIDER - CARE PROVIDER_API CALL
Daniel Norton [place]  Urology  [address] [place] [id number]  Phone: [phone]  Fax: [phone]  Established Patient  Follow Up Time: 1 week

## 2024-08-01 NOTE — PROGRESS NOTE ADULT - PROBLEM SELECTOR PLAN 2
Patient is COVID positive, wife also tested positive. Patient appears symptomatic, no sore throat or runny nose; however COVID infection might be a component of overall clinical picture given his decreasing white count and declining mental status.     - Patient started on 5 day course of Remdesivir on 07/30. LFT's are wnl. C/w remdesivir Patient is COVID positive, wife also tested positive. Patient appears symptomatic, no sore throat or runny nose; however COVID infection might be a component of overall clinical picture given his decreasing white count and declining mental status.     - Patient started on 5 day course of Remdesivir on 07/30. LFT's are wnl. Given clinical improvement and plan for DC - no need to continue remdes.

## 2024-08-01 NOTE — DISCHARGE NOTE PROVIDER - NSDCCPTREATMENT_GEN_ALL_CORE_FT
PRINCIPAL PROCEDURE  Procedure: CT abdomen pelvis  Findings and Treatment: FINDINGS:  LOWER CHEST: Aortic and coronary artery calcification.  LIVER: Within normal limits.  BILE DUCTS: Normal caliber.  GALLBLADDER: Cholelithiasis.  SPLEEN: Within normal limits.  PANCREAS: Within normal limits.  ADRENALS: Within normal limits.  KIDNEYS/URETERS: No hydronephrosis. Bilateral renal cysts. Focal left   renal cortical scarring.  BLADDER: Mild wall thickening of the bladder with mild surrounding   inflammatory changes of the anterior superior wall.  REPRODUCTIVE ORGANS: Significantly Enlarged prostate protruding into the   posterior wall of the bladder  BOWEL: Approximately 3.6 cm medially oriented diverticulum in the D2   segment of the duodenum. No bowel obstruction. Appendix is normal.  PERITONEUM/RETROPERITONEUM: Within normal limits.  VESSELS: Atherosclerotic changes. Right intrahepatic portal vein varix   measuring 2.6 cm (301-32).  LYMPH NODES: No lymphadenopathy.  ABDOMINAL WALL: Small bilateral fat-containing inguinal hernias.  BONES: Degenerative changes. Approximately 1 cm L5 anterolisthesis on S1   secondary to bilateral pars defect.  IMPRESSION:  Focal area of wall thickening and inflammatory change along the anterior   superior urinary bladder, unclear whether related to underlying cystitis,   focally infected tiny diverticula or less likely urachal lesion.   Correlation with urinalysis and follow-up is recommended.  Prostatomegaly, correlation with PSA values recommended.  Right intrahepatic portal vein varix measuring 2.6 cm.

## 2024-08-01 NOTE — PROGRESS NOTE ADULT - PROBLEM SELECTOR PLAN 6
TSH elevated, likely in the setting of severe sepsis, but patient could benefit from outpatient f/u    -FT4 WNL and TSH increased, likely iso sepsis

## 2024-08-01 NOTE — DISCHARGE NOTE PROVIDER - ATTENDING DISCHARGE PHYSICAL EXAMINATION:
Pt seen and evaluated at bedside this AM. No o/n events. Son present at bedside. Pt feels well. No f/c, NV, SOB, CP.     Gen- In bed, comfortable, NAD  Resp- CTAB, good effort.  CVS- RRR, S1S2, no g/r/m.  GI- Soft abd, Nt, ND, +BSx4  Ext- NO C/C.   Neuro- CN II-XII intact. Speech fluent/face symmetric.

## 2024-08-01 NOTE — PROGRESS NOTE ADULT - ATTENDING COMMENTS
Pt med stable for DC home w/ family. Rest of plan as above. Refer to DC sum 8/1 for rest of details.

## 2024-08-01 NOTE — DISCHARGE NOTE PROVIDER - NSFOLLOWUPCLINICS_GEN_ALL_ED_FT
[facility] - Primary Care  Primary Care  [address] [place] [address]  Phone: [phone]  Fax:      [facility]  Internal Medicine  [address] [place] [address]  Phone: [phone]  Fax: [phone]

## 2024-08-01 NOTE — PROGRESS NOTE ADULT - ASSESSMENT
Subjective   Dana Juarez is a 23 y.o. female here today for rash.    Chief Complaint   Patient presents with   • Rash     pelvic area   [place] is here today with a 4-week history of a rash in her groin area.  She initially developed blisters on her buttock that did not improve with bupropion ointment.  She had started the Wellbutrin around this time so this medication was stopped.  When the blisters did not improve, doxycycline was called in and she was also having worsening acne.  This resolved the initial rash, however now she has developed redness and peeling of her skin on her left groin.  It is itchy and painful.  She did change her razor.  She was also started on Lamictal around this time and was concerned this was from the Lamictal and thus stopped it.    Review of Systems   Constitutional: Negative for activity change, appetite change, chills, fever, unexpected weight gain and unexpected weight loss.   HENT: Negative for congestion, ear pain, nosebleeds, postnasal drip, sore throat, trouble swallowing and voice change.    Eyes: Negative for blurred vision, pain, itching and visual disturbance.   Respiratory: Negative for cough and shortness of breath.    Cardiovascular: Negative for chest pain and palpitations.   Gastrointestinal: Negative for blood in stool, diarrhea, nausea and vomiting.   Endocrine: Negative for polydipsia, polyphagia and polyuria.   Genitourinary: Negative for dysuria, flank pain, frequency, genital sores, hematuria and urgency.   Musculoskeletal: Negative for arthralgias and myalgias.   Skin: Negative for rash and skin lesions.   Allergic/Immunologic: Negative for environmental allergies, food allergies and immunocompromised state.   Neurological: Negative for dizziness, seizures, weakness, headache, memory problem and confusion.   Psychiatric/Behavioral: Positive for decreased concentration, self-injury, sleep disturbance, depressed mood and stress. Negative for suicidal ideas. The  "patient is nervous/anxious.         Improving, now seeing [facility]       Past Medical History:   Diagnosis Date   • Acid reflux    • ADHD (attention deficit hyperactivity disorder)    • Anxiety    • Depression    • Irregular periods     heavy periods   • Seasonal allergies      Family History   Problem Relation Age of Onset   • COPD Maternal Grandmother    • COPD Maternal Grandfather      Past Surgical History:   Procedure Laterality Date   • WISDOM TOOTH EXTRACTION       Social History     Socioeconomic History   • Marital status: Single     Spouse name: Not on file   • Number of children: Not on file   • Years of education: Not on file   • Highest education level: Not on file   Tobacco Use   • Smoking status: Former Smoker     Packs/day: 0.25     Years: 0.00     Pack years: 0.00     Types: Cigarettes     Start date:      Last attempt to quit: 2018     Years since quittin.3   • Smokeless tobacco: Never Used   • Tobacco comment: Smoked 6 months last year while in [place] for school    Substance and Sexual Activity   • Alcohol use: Yes     Comment: socially    • Drug use: Never         Current Outpatient Medications:   •  VYVANSE 30 MG capsule, TK 1 C PO QAM, Disp: , Rfl:   •  fluconazole (Diflucan) 150 MG tablet, Take 1 tablet by mouth 1 (One) Time for 1 dose., Disp: 1 tablet, Rfl: 0  •  nystatin (MYCOSTATIN) 157736 UNIT/GM ointment, Apply  topically to the appropriate area as directed 2 (Two) Times a Day. For 14 days, Disp: 30 g, Rfl: 0    Objective   Vitals:    20 1124   BP: 114/70   Pulse: 66   Resp: 18   Temp: 98.2 °F (36.8 °C)   TempSrc: Temporal   SpO2: 98%   Weight: 99.3 kg (219 lb)   Height: 167.6 cm (66\")     Body mass index is 35.35 kg/m².  Physical Exam   Constitutional: She is oriented to person, place, and time. She appears well-developed and well-nourished. No distress.   Pulmonary/Chest: Effort normal. No accessory muscle usage. No respiratory distress.   Neurological: " She is alert and oriented to person, place, and time.   Skin: No erythema. No pallor.        Psychiatric: She has a normal mood and affect. Her speech is normal and behavior is normal. Judgment and thought content normal.   Nursing note and vitals reviewed.      Assessment/Plan   Problem List Items Addressed This Visit     None      Visit Diagnoses     [facility]    -  Primary    Relevant Medications    fluconazole (Diflucan) 150 MG tablet    nystatin (MYCOSTATIN) 352419 UNIT/GM ointment        Dana was seen today for rash.    Diagnoses and all orders for this visit:    [facility]  -     fluconazole (Diflucan) 150 MG tablet; Take 1 tablet by mouth 1 (One) Time for 1 dose.  -     nystatin (MYCOSTATIN) 341281 UNIT/GM ointment; Apply  topically to the appropriate area as directed 2 (Two) Times a Day. For 14 days    Advised to keep the area clean and dry.  Do not reuse towels and wash in hot water with bleach.  Reduce carbohydrate intake.  Advised this may have been from being on the antibiotic and likely not a Lamictal rash.          The patient was counseled regarding diagnostic results, impressions, prognosis, instructions for management, risk factor reductions, education, and importance of treatment compliance.  The patient verbalized understanding of and agreement with the plan of care.    Advised patient to call with any further questions and any new or worsening symptoms.     Return if symptoms worsen or fail to improve.      CHRISTINA Avila    Please note that portions of this note were completed with a voice recognition program. Efforts were made to edit the dictations, but occasionally words are mistranscribed.   89-year-old male history of hypertension, BPH  presents with 1 week of dysuria, increased urinary frequency, and now 1 day of rigors/chills and fatigue admitted for sepsis 2/2 pyelonephritis

## 2024-08-01 NOTE — PROGRESS NOTE ADULT - PROBLEM SELECTOR PLAN 1
Pt p/w tmax 103, history + UA. UA currently with no bacteria, likely because patient on antibiotics. given pt meets criteria for severe sepsis (lactic acidosis, mild hypotension) and with recent hospitalization, treat with zosyn and vanc x1    -f/u urine cultures done as outpatient (urologist Dr. Daniel Norton, part of [facility])  -Bcx growing E. Coli in both bottles, sensitive to ceftriaxone   -C/w ceftriaxone  -White count downtrending, continue to trend Pt p/w tmax 103, history + UA. UA currently with no bacteria, likely because patient on antibiotics. given pt meets criteria for severe sepsis (lactic acidosis, mild hypotension) and with recent hospitalization, treat with zosyn and vanc x1    -f/u urine cultures negative   -Bcx growing E. Coli in both bottles, sensitive to ceftriaxone   -C/w ceftriaxone  -White count downtrending, continue to trend Pt p/w tmax 103, history + UA. UA currently with no bacteria, likely because patient on antibiotics. given pt meets criteria for severe sepsis (lactic acidosis, mild hypotension) and with recent hospitalization, treat with zosyn and vanc x1    -f/u urine cultures negative   -Bcx growing E. Coli in both bottles, sensitive to ceftriaxone   -C/w ceftriaxone -> switch to Augmentin on DC.  -White count downtrending, continue to trend

## 2024-08-01 NOTE — PROGRESS NOTE ADULT - SUBJECTIVE AND OBJECTIVE BOX
***************************************************************  Shant Myles  (PGY1) Internal Medicine  On TEAMS  ***************************************************************    PROGRESS NOTE:     Patient is a 89y old  Male who presents with a chief complaint of rigors (31 Jul 2024 09:13)        INTERVAL EVENTS:   SUBJECTIVE / OVERNIGHT EVENTS: No acute overnight events. Patient was seen and examined by the bedside this AM.   OXYGEN:   TELEMETRY:       MEDICATIONS  (STANDING):  albuterol/ipratropium for Nebulization 3 milliLiter(s) Nebulizer every 6 hours  aspirin enteric coated 81 milliGRAM(s) Oral daily  cefTRIAXone   IVPB 2000 milliGRAM(s) IV Intermittent every 24 hours  enoxaparin Injectable 40 milliGRAM(s) SubCutaneous every 24 hours  pantoprazole    Tablet 40 milliGRAM(s) Oral before breakfast  remdesivir  IVPB   IV Intermittent   remdesivir  IVPB 100 milliGRAM(s) IV Intermittent every 24 hours  tamsulosin 0.4 milliGRAM(s) Oral at bedtime    MEDICATIONS  (PRN):  acetaminophen     Tablet .. 650 milliGRAM(s) Oral every 6 hours PRN Temp greater or equal to 38C (100.4F), Mild Pain (1 - 3)  aluminum hydroxide/magnesium hydroxide/simethicone Suspension 30 milliLiter(s) Oral every 4 hours PRN Dyspepsia  melatonin 3 milliGRAM(s) Oral at bedtime PRN Insomnia  ondansetron Injectable 4 milliGRAM(s) IV Push every 8 hours PRN Nausea and/or Vomiting      CAPILLARY BLOOD GLUCOSE        I&O's Summary      PHYSICAL EXAM:  Vital Signs Last 24 Hrs  T(C): 36.7 (01 Aug 2024 05:33), Max: 37.2 (31 Jul 2024 14:41)  T(F): 98.1 (01 Aug 2024 05:33), Max: 98.9 (31 Jul 2024 14:41)  HR: 71 (01 Aug 2024 05:33) (71 - 77)  BP: 122/50 (01 Aug 2024 05:33) (122/50 - 146/64)  BP(mean): --  RR: 16 (01 Aug 2024 05:33) (16 - 18)  SpO2: 94% (01 Aug 2024 05:33) (94% - 98%)    Parameters below as of 01 Aug 2024 05:33  Patient On (Oxygen Delivery Method): room air        PHYSICAL EXAM:  GENERAL: NAD, well-groomed, well-developed  HEAD:  Atraumatic, Normocephalic  EYES: EOMI, PERRLA, conjunctiva and sclera clear  ENMT: No tonsillar erythema, exudates, or enlargement; Moist mucous membranes, Good dentition, No lesions  NECK: Supple, No JVD, Normal thyroid  NERVOUS SYSTEM:  Alert & Oriented X3, Good concentration; Motor Strength 5/5 B/L upper and lower extremities; DTRs 2+ intact and symmetric  CHEST/LUNG: Clear to auscultation bilaterally; No rales, rhonchi, wheezing, or rubs  HEART: Regular rate and rhythm; No murmurs, rubs, or gallops  ABDOMEN: Soft, Nontender, Nondistended; Bowel sounds present  EXTREMITIES:  2+ Peripheral Pulses, No clubbing, cyanosis, or edema  LYMPH: No lymphadenopathy noted  SKIN: No rashes or lesions    LABS:                        8.0    2.96  )-----------( 292      ( 31 Jul 2024 06:17 )             25.9     07-31    140  |  106  |  12  ----------------------------<  91  4.0   |  23  |  0.85    Ca    8.2<L>      31 Jul 2024 06:17  Phos  2.3     07-31  Mg     2.00     07-31    TPro  6.2  /  Alb  3.2<L>  /  TBili  0.3  /  DBili  x   /  AST  18  /  ALT  11  /  AlkPhos  60  07-31          Urinalysis Basic - ( 31 Jul 2024 06:17 )    Color: x / Appearance: x / SG: x / pH: x  Gluc: 91 mg/dL / Ketone: x  / Bili: x / Urobili: x   Blood: x / Protein: x / Nitrite: x   Leuk Esterase: x / RBC: x / WBC x   Sq Epi: x / Non Sq Epi: x / Bacteria: x        Culture - Urine (collected 29 Jul 2024 08:52)  Source: Clean Catch Clean Catch (Midstream)  Final Report (30 Jul 2024 08:22):    <10,000 CFU/mL Normal Urogenital Gloria        COORDINATION OF CARE:  Care Discussed with Consultants/Other Providers [Y/N]:  Prior or Outpatient Records Reviewed [Y/N]: ***************************************************************  Shant Myles  (PGY1) Internal Medicine  On TEAMS  ***************************************************************    PROGRESS NOTE:     Patient is a 89y old  Male who presents with a chief complaint of rigors (31 Jul 2024 09:13)        INTERVAL EVENTS:   SUBJECTIVE / OVERNIGHT EVENTS: No acute overnight events. Patient was seen and examined by the bedside this AM. Patient reports feeling much better and is visibly much less confused than days prior. Denies fevers, chills, sore throat, or runny nose; but did mention feeling a little mucus in his chest. Otherwise no complaints.       MEDICATIONS  (STANDING):  albuterol/ipratropium for Nebulization 3 milliLiter(s) Nebulizer every 6 hours  aspirin enteric coated 81 milliGRAM(s) Oral daily  cefTRIAXone   IVPB 2000 milliGRAM(s) IV Intermittent every 24 hours  enoxaparin Injectable 40 milliGRAM(s) SubCutaneous every 24 hours  pantoprazole    Tablet 40 milliGRAM(s) Oral before breakfast  remdesivir  IVPB   IV Intermittent   remdesivir  IVPB 100 milliGRAM(s) IV Intermittent every 24 hours  tamsulosin 0.4 milliGRAM(s) Oral at bedtime    MEDICATIONS  (PRN):  acetaminophen     Tablet .. 650 milliGRAM(s) Oral every 6 hours PRN Temp greater or equal to 38C (100.4F), Mild Pain (1 - 3)  aluminum hydroxide/magnesium hydroxide/simethicone Suspension 30 milliLiter(s) Oral every 4 hours PRN Dyspepsia  melatonin 3 milliGRAM(s) Oral at bedtime PRN Insomnia  ondansetron Injectable 4 milliGRAM(s) IV Push every 8 hours PRN Nausea and/or Vomiting      CAPILLARY BLOOD GLUCOSE        I&O's Summary      PHYSICAL EXAM:  Vital Signs Last 24 Hrs  T(C): 36.7 (01 Aug 2024 05:33), Max: 37.2 (31 Jul 2024 14:41)  T(F): 98.1 (01 Aug 2024 05:33), Max: 98.9 (31 Jul 2024 14:41)  HR: 71 (01 Aug 2024 05:33) (71 - 77)  BP: 122/50 (01 Aug 2024 05:33) (122/50 - 146/64)  BP(mean): --  RR: 16 (01 Aug 2024 05:33) (16 - 18)  SpO2: 94% (01 Aug 2024 05:33) (94% - 98%)    Parameters below as of 01 Aug 2024 05:33  Patient On (Oxygen Delivery Method): room air        PHYSICAL EXAM:  GENERAL: NAD  HEAD:  Atraumatic, Normocephalic  EYES: EOMI, PERRLA, conjunctiva and sclera clear  NECK: Supple, No JVD, Normal thyroid  NERVOUS SYSTEM:  Alert & Oriented X2, oriented to person and place, Good concentration   CHEST/LUNG: Wheezing heard diffusely on auscultation   HEART: Regular rate and rhythm; No murmurs, rubs, or gallops  ABDOMEN: Soft, Nontender, Nondistended; Bowel sounds present  EXTREMITIES:  2+ Peripheral Pulses, No clubbing, cyanosis, or edema  LYMPH: No lymphadenopathy noted  SKIN: No rashes or lesions    LABS:                        8.0    2.96  )-----------( 292      ( 31 Jul 2024 06:17 )             25.9     07-31    140  |  106  |  12  ----------------------------<  91  4.0   |  23  |  0.85    Ca    8.2<L>      31 Jul 2024 06:17  Phos  2.3     07-31  Mg     2.00     07-31    TPro  6.2  /  Alb  3.2<L>  /  TBili  0.3  /  DBili  x   /  AST  18  /  ALT  11  /  AlkPhos  60  07-31          Urinalysis Basic - ( 31 Jul 2024 06:17 )    Color: x / Appearance: x / SG: x / pH: x  Gluc: 91 mg/dL / Ketone: x  / Bili: x / Urobili: x   Blood: x / Protein: x / Nitrite: x   Leuk Esterase: x / RBC: x / WBC x   Sq Epi: x / Non Sq Epi: x / Bacteria: x        Culture - Urine (collected 29 Jul 2024 08:52)  Source: Clean Catch Clean Catch (Midstream)  Final Report (30 Jul 2024 08:22):    <10,000 CFU/mL Normal Urogenital Gloria        COORDINATION OF CARE:  Care Discussed with Consultants/Other Providers [Y/N]:  Prior or Outpatient Records Reviewed [Y/N]:

## 2024-08-01 NOTE — DISCHARGE NOTE NURSING/CASE MANAGEMENT/SOCIAL WORK - PATIENT PORTAL LINK FT
You can access the FollowMyHealth Patient Portal offered by [facility] by registering at the following website: [email or web address]. By joining [facility]s FollowMyHealth portal, you will also be able to view your health information using other applications (apps) compatible with our system.

## 2024-08-01 NOTE — DISCHARGE NOTE PROVIDER - NSDCFUADDAPPT_GEN_ALL_CORE_FT
APPTS ARE READY TO BE MADE: [x] YES    Additional Information about above appointments (if needed):    1: Dr. Norton  2: PCP    If you need a PCP, or want a new one please make an appointment with General Internal Medicine, [address] [place] [address], [address], [place] [address]. Please call [phone] to make an appointment

## 2024-08-01 NOTE — DISCHARGE NOTE PROVIDER - NSDCCPCAREPLAN_GEN_ALL_CORE_FT
PRINCIPAL DISCHARGE DIAGNOSIS  Diagnosis: Gram negative sepsis  Assessment and Plan of Treatment: You came to the emergency department feeling unwell, with a likely UTI, with high fevers and chills. After taking blood tests, you were found to be bacteremic (bacteria in your blood), which was likely why you were feeling so unwell. You developed sepsis, which is a severe inflammatory response that your body has to an infection. We treated you with antibiotics through the IV, and saw impressive clinical improvement. You were also diagnosed with COVID; you were started on remdesivir, however you were stable with your course; so we felt it was to stop the course and discharge you on oral medicine (Augmentin) to finish a full antibiotic course for your bacteremia and UTI. You will be taking 875mg twice a day starting tomorrow August 2, and ending on August 10.    Please follow up with your primary care provider and urologist within 1-2 weeks of discharge.  If at any point you feel feverish, chills, short of breath, increasingly confused, or have severely decreased urinary output, please seek emergent care in the ED.     PRINCIPAL DISCHARGE DIAGNOSIS  Diagnosis: Gram negative sepsis  Assessment and Plan of Treatment: You came to the emergency department feeling unwell, with a likely UTI, with high fevers and chills. After taking blood tests, you were found to be bacteremic (bacteria in your blood), which was likely why you were feeling so unwell. You developed sepsis, which is a severe inflammatory response that your body has to an infection. We treated you with antibiotics through the IV, and saw impressive clinical improvement. You were also diagnosed with COVID; you were started on remdesivir, however you were stable with your course; so we felt it was to stop the course and discharge you on oral medicine (Augmentin) to finish a full antibiotic course for your bacteremia and UTI. You will be taking 875mg (1 pill) twice a day starting tomorrow August 2, and ending on August 10.    Please follow up with your primary care provider and urologist within 1-2 weeks of discharge.  If at any point you feel feverish, chills, short of breath, increasingly confused, or have severely decreased urinary output, please seek emergent care in the ED.     PRINCIPAL DISCHARGE DIAGNOSIS  Diagnosis: Gram negative sepsis  Assessment and Plan of Treatment: You came to the emergency department feeling unwell, with a likely UTI, with high fevers and chills. After taking blood tests, you were found to be bacteremic (bacteria in your blood), which was likely why you were feeling so unwell. You developed sepsis, which is a severe inflammatory response that your body has to an infection. We treated you with antibiotics through the IV, and saw impressive clinical improvement. You were also diagnosed with COVID; you were started on remdesivir, however you were stable with your course; so we felt it was to stop the course and discharge you on oral medicine (Augmentin) to finish a full antibiotic course for your bacteremia and UTI. You will be taking 875mg (1 pill) twice a day starting tomorrow August 2, and ending on August 8    Please follow up with your primary care provider and urologist within 1-2 weeks of discharge.  If at any point you feel feverish, chills, short of breath, increasingly confused, or have severely decreased urinary output, please seek emergent care in the ED.

## 2024-08-01 NOTE — PROGRESS NOTE ADULT - PROBLEM SELECTOR PLAN 7
- Fluids: None  - Electrolytes: Will replete to maintain K>4, Phos>3, and Mag>2  - Nutrition: DASH   - Activity: As tolerated   - DVT Prophylaxis: Lovenox   - Disposition: F/u PT recs - Fluids: None  - Electrolytes: Will replete to maintain K>4, Phos>3, and Mag>2  - Nutrition: DASH   - Activity: As tolerated   - DVT Prophylaxis: Lovenox   - Disposition: Home w/ PT

## 2024-08-01 NOTE — DISCHARGE NOTE PROVIDER - HOSPITAL COURSE
HPI:  Additional collateral obtained from son over the phone    89-year-old male history of hypertension, BPH  presents with 1 week of dysuria, increased urinary frequency, and now 1 day of rigors/chills and fatigue.  Patient was reportedly seen and evaluated by his urologist (Dr. Daniel Norton) this past Thursday for dysuria and started on Keflex.  Despite starting antibiotics Friday, pt developed fevers and rigors. Son brought him to urgent care who recommended pt present to ED. Son reports that pt was hospitalized about 20 days ago for a UTI. Pt denies cough, shortness of breath, chest pain, flank pain, abdominal pain or LE edema. (29 Jul 2024 03:06)    Hospital Course: Patient met SIRS criteria with Tmax of 103 and tachypnea upon coming into the ED and was altered on admission; patient was diagnosed with a UTI, and blood cultures were drawn which grew ceftriaxone sensitive E. Coli. Patient also tested positive for COVID however no symptoms and no respiratory distress. Patient became afebrile for 24 hours, became less encephalopathic with significant overall clinical improvement.     The patient is afebrile, hemodynamically stable and medically optimized for discharge to home with follow up with Dr. Norton. On day of discharge, patient is clinically stable with no new exam findings or acute symptoms compared to prior. The patient was seen by the attending physician on the date of discharge and deemed stable and acceptable for discharge. The patient's chronic medical conditions were treated accordingly per the patient's home medication regimen. The patient's medication reconciliation (with changes made to chronic medications), follow up appointments, discharge orders, instructions, and significant lab and diagnostic studies are as noted.       Important Medication Changes and Reason:  - Augmentin 875 BID for 7 days, to end on August 8, 2024     Active or Pending Issues Requiring Follow-up:  - F/u with urology     Advanced Directives:   [x] Full code  [ ] DNR  [ ] Hospice    Discharge Diagnoses: Gram negative sepsis due to E. Coli          HPI:  Additional collateral obtained from son over the phone    89-year-old male history of hypertension, BPH  presents with 1 week of dysuria, increased urinary frequency, and now 1 day of rigors/chills and fatigue.  Patient was reportedly seen and evaluated by his urologist (Dr. Daniel Norton) this past Thursday for dysuria and started on Keflex.  Despite starting antibiotics Friday, pt developed fevers and rigors. Son brought him to urgent care who recommended pt present to ED. Son reports that pt was hospitalized about 20 days ago for a UTI. Pt denies cough, shortness of breath, chest pain, flank pain, abdominal pain or LE edema. (29 Jul 2024 03:06)    Hospital Course: Patient met SIRS criteria with Tmax of 103 and tachypnea upon coming into the ED and was altered on admission; patient was diagnosed with a UTI, and blood cultures were drawn which grew ceftriaxone sensitive E. Coli. Patient also tested positive for COVID however no symptoms and no respiratory distress. Patient became afebrile for 24 hours, became less encephalopathic with significant overall clinical improvement.     The patient is afebrile, hemodynamically stable and medically optimized for discharge to home with follow up with Dr. Norton. On day of discharge, patient is clinically stable with no new exam findings or acute symptoms compared to prior. The patient was seen by the attending physician on the date of discharge and deemed stable and acceptable for discharge. The patient's chronic medical conditions were treated accordingly per the patient's home medication regimen. The patient's medication reconciliation (with changes made to chronic medications), follow up appointments, discharge orders, instructions, and significant lab and diagnostic studies are as noted.       Important Medication Changes and Reason:  - Augmentin 875 BID for 7 days, to end on August 8, 2024     Active or Pending Issues Requiring Follow-up:  - F/u with urology   - F/u with PCP    Advanced Directives:   [x] Full code  [ ] DNR  [ ] Hospice    Discharge Diagnoses: Gram negative sepsis due to E. Coli            HPI:  Additional collateral obtained from son over the phone    89-year-old male history of hypertension, BPH  presents with 1 week of dysuria, increased urinary frequency, and now 1 day of rigors/chills and fatigue.  Patient was reportedly seen and evaluated by his urologist (Dr. Daniel Norton) this past Thursday for dysuria and started on Keflex.  Despite starting antibiotics Friday, pt developed fevers and rigors. Son brought him to urgent care who recommended pt present to ED. Son reports that pt was hospitalized about 20 days ago for a UTI. Pt denies cough, shortness of breath, chest pain, flank pain, abdominal pain or LE edema. (29 Jul 2024 03:06)    Hospital Course: Patient met SIRS criteria with Tmax of 103 and tachypnea upon coming into the ED and was altered on admission; patient was diagnosed with a UTI, and blood cultures were drawn which grew ceftriaxone sensitive E. Coli. Patient also tested positive for COVID however no symptoms and no respiratory distress. Patient became afebrile for 24 hours, became less encephalopathic with significant overall clinical improvement.     The patient is afebrile, hemodynamically stable and medically optimized for discharge to home with follow up with Dr. Norton. On day of discharge, patient is clinically stable with no new exam findings or acute symptoms compared to prior. The patient was seen by the attending physician on the date of discharge and deemed stable and acceptable for discharge. The patient's chronic medical conditions were treated accordingly per the patient's home medication regimen. The patient's medication reconciliation (with changes made to chronic medications), follow up appointments, discharge orders, instructions, and significant lab and diagnostic studies are as noted.       Important Medication Changes and Reason:  - Augmentin 875 BID for 7 days, to end on August 8, 2024     Active or Pending Issues Requiring Follow-up:  - F/u with urology   - F/u with PCP    Advanced Directives:   [x] Full code  [ ] DNR  [ ] Hospice    Discharge Diagnoses:   #Severe sepsis 2' E Coli UTI c/b E Coli bacteremia.  #COVID-19 PNA  #HTN  #BPH  #Lactic acidosis  #Anemia   #Leukopenia  #Thrombocytosis

## 2024-08-01 NOTE — PROGRESS NOTE ADULT - PROBLEM SELECTOR PLAN 4
-unknown baseline hgb  -no evidence of acute bleeding  -Retic ct wnl   -transfuse for hgb <7 -Stable - likely 2' AOCD.  -no evidence of acute bleeding  -Retic ct wnl   -transfuse for hgb <7

## 2024-08-01 NOTE — DISCHARGE NOTE PROVIDER - CARE PROVIDERS DIRECT ADDRESSES
,[email or web address]

## 2024-08-12 RX ORDER — ASPIRIN 500 MG
1 TABLET ORAL
Refills: 0 | DISCHARGE

## 2024-08-12 RX ORDER — AMLODIPINE BESYLATE 2.5 MG/1
1 TABLET ORAL
Refills: 0 | DISCHARGE

## 2024-08-12 RX ORDER — TAMSULOSIN HCL 0.4 MG
1 CAPSULE ORAL
Refills: 0 | DISCHARGE

## 2024-08-13 ENCOUNTER — APPOINTMENT (OUTPATIENT)
Dept: CARE COORDINATION | Facility: HOME HEALTH | Age: 89
End: 2024-08-13
Payer: MEDICARE

## 2024-08-13 DIAGNOSIS — I10 ESSENTIAL (PRIMARY) HYPERTENSION: ICD-10-CM

## 2024-08-13 DIAGNOSIS — Z87.891 PERSONAL HISTORY OF NICOTINE DEPENDENCE: ICD-10-CM

## 2024-08-13 DIAGNOSIS — H40.9 UNSPECIFIED GLAUCOMA: ICD-10-CM

## 2024-08-13 DIAGNOSIS — H04.123 DRY EYE SYNDROME OF BILATERAL LACRIMAL GLANDS: ICD-10-CM

## 2024-08-13 DIAGNOSIS — N40.0 BENIGN PROSTATIC HYPERPLASIA WITHOUT LOWER URINARY TRACT SYMPMS: ICD-10-CM

## 2024-08-13 PROBLEM — N39.0 URINARY TRACT INFECTION, SITE NOT SPECIFIED: Chronic | Status: ACTIVE | Noted: 2024-07-28

## 2024-08-13 PROCEDURE — 99348 HOME/RES VST EST LOW MDM 30: CPT

## 2024-08-14 ENCOUNTER — APPOINTMENT (OUTPATIENT)
Dept: UROLOGY | Facility: CLINIC | Age: 89
End: 2024-08-14
Payer: MEDICARE

## 2024-08-14 DIAGNOSIS — Z87.440 PERSONAL HISTORY OF URINARY (TRACT) INFECTIONS: ICD-10-CM

## 2024-08-14 PROBLEM — H04.123 DRY EYES: Status: ACTIVE | Noted: 2024-08-14

## 2024-08-14 PROBLEM — Z87.891 FORMER SMOKER: Status: ACTIVE | Noted: 2024-08-14

## 2024-08-14 PROBLEM — H40.9 GLAUCOMA: Status: ACTIVE | Noted: 2024-08-14

## 2024-08-14 PROBLEM — N40.0 BPH (BENIGN PROSTATIC HYPERPLASIA): Status: ACTIVE | Noted: 2024-08-14

## 2024-08-14 PROBLEM — I10 HTN (HYPERTENSION): Status: ACTIVE | Noted: 2024-08-14

## 2024-08-14 PROCEDURE — 99213 OFFICE O/P EST LOW 20 MIN: CPT

## 2024-08-14 PROCEDURE — 51798 US URINE CAPACITY MEASURE: CPT

## 2024-08-14 PROCEDURE — 81003 URINALYSIS AUTO W/O SCOPE: CPT | Mod: QW

## 2024-08-14 RX ORDER — AMLODIPINE BESYLATE 5 MG/1
5 TABLET ORAL
Qty: 30 | Refills: 0 | Status: ACTIVE | COMMUNITY
Start: 2024-08-14

## 2024-08-14 RX ORDER — LATANOPROST/PF 0.005 %
0.01 DROPS OPHTHALMIC (EYE)
Refills: 0 | Status: ACTIVE | COMMUNITY
Start: 2024-08-14

## 2024-08-14 RX ORDER — ASPIRIN 325 MG
325 TABLET ORAL
Refills: 0 | Status: ACTIVE | COMMUNITY
Start: 2024-08-14

## 2024-08-14 RX ORDER — POLYETHYLENE GLYCOL 400 AND PROPYLENE GLYCOL 4; 3 MG/ML; MG/ML
0.4-0.3 SOLUTION/ DROPS OPHTHALMIC
Refills: 0 | Status: ACTIVE | COMMUNITY
Start: 2024-08-14

## 2024-08-14 NOTE — PHYSICAL EXAM
[No Acute Distress] : no acute distress [Normal Sclera/Conjunctiva] : normal sclera/conjunctiva [Normal Outer Ear/Nose] : the outer ears and nose were normal in appearance [No JVD] : no jugular venous distention [No Respiratory Distress] : no respiratory distress  [No Accessory Muscle Use] : no accessory muscle use [Normal] : normal gait, coordination grossly intact, no focal deficits and deep tendon reflexes were 2+ and symmetric [Speech Grossly Normal] : speech grossly normal [Memory Grossly Normal] : memory grossly normal [Alert and Oriented x3] : oriented to person, place, and time [Normal Mood] : the mood was normal

## 2024-08-14 NOTE — REVIEW OF SYSTEMS
[Negative] : Heme/Lymph [Fever] : no fever [Vision Problems] : no vision problems [Hearing Loss] : no hearing loss [Chest Pain] : no chest pain [Lower Ext Edema] : no lower extremity edema [Shortness Of Breath] : no shortness of breath [Cough] : no cough [Constipation] : no constipation [Incontinence] : no incontinence [Muscle Weakness] : no muscle weakness [Unsteady Walk] : no ataxia [Insomnia] : no insomnia

## 2024-08-14 NOTE — HISTORY OF PRESENT ILLNESS
[Home] : at home, [unfilled] , at the time of the visit. [Other Location: e.g. Home (Enter Location, City,State)___] : at [unfilled] [Spouse] : spouse [Other:____] : [unfilled] [Verbal consent obtained from patient] : the patient, [unfilled] [FreeTextEntry1] : F/u post hospitalization at [facility] from 7/29 - 8/1 for Rigors. [de-identified] : This patient is Enrolled in the Post-Discharge [facility] Follow Up Program through [facility] for Continuity of Care S/P Recent Hospitalization until seen by PCP.  Brief Hospital Course copied: 89-year-old male history of hypertension, BPH presents with 1 week of dysuria, increased urinary frequency, and now 1 day of rigors/chills and fatigue. Patient was reportedly seen and evaluated by his urologist (Dr. Daniel Norton) this past Thursday for dysuria and started on Keflex.  Despite starting antibiotics Friday, pt developed fevers and rigors. Son brought him to urgent care who recommended pt present to ED. Son reports that pt was hospitalized about 20 days ago for a UTI. Pt denies cough, shortness of breath, chest pain, flank pain, abdominal pain or LE edema. (29 Jul 2024 03:06) Patient met SIRS criteria with Tmax of 103 and tachypnea upon coming into the ED and was altered on admission; patient was diagnosed with a UTI, and blood cultures were drawn which grew ceftriaxone sensitive E. Coli.  Televisit made with pt, He is alert and oriented x 3 Wallisian speaking. His wife who translated during the visit. was dx with COVID and UTI, all symptoms resolved. Pt reports he will be going back home to [place] on 8/28. Pt was seen by [facility] - BP meds were renewed. He has PCP appointment on Thursday and URO 8/14.

## 2024-08-14 NOTE — PLAN
[FreeTextEntry1] : 1. cont all medications as prescribed 2. maintain adequate hydration and nutrition 3. keep f/u with all providers 4.

## 2024-08-14 NOTE — ASSESSMENT
[FreeTextEntry1] : Patient met SIRS criteria with Tmax of 103 and tachypnea coming into the ED and was altered on admission; patient was diagnosed with a UTI, and blood cultures were drawn which grew ceftriaxone sensitive E. Coli. Patient also tested positive for COVID however no symptoms and no respiratory distress. Patient became afebrile for 24 hours, became less encephalopathic with significant overall clinical improvement.

## 2024-08-14 NOTE — HISTORY OF PRESENT ILLNESS
[Home] : at home, [unfilled] , at the time of the visit. [Other Location: e.g. Home (Enter Location, City,State)___] : at [unfilled] [Spouse] : spouse [Other:____] : [unfilled] [Verbal consent obtained from patient] : the patient, [unfilled] [FreeTextEntry1] : F/u post hospitalization at [facility] from 7/29 - 8/1 for Rigors. [de-identified] : This patient is Enrolled in the Post-Discharge [facility] Care Services Follow Up Program through [facility] for Continuity of Care S/P Recent Hospitalization until seen by PCP.  Brief Hospital Course copied: 89-year-old male history of hypertension, BPH presents with 1 week of dysuria, increased urinary frequency, and now 1 day of rigors/chills and fatigue. Patient was reportedly seen and evaluated by his urologist (Dr. Daniel Norton) this past Thursday for dysuria and started on Keflex.  Despite starting antibiotics Friday, pt developed fevers and rigors. Son brought him to urgent care who recommended pt present to ED. Son reports that pt was hospitalized about 20 days ago for a UTI. Pt denies cough, shortness of breath, chest pain, flank pain, abdominal pain or LE edema. (29 Jul 2024 03:06) Patient met SIRS criteria with Tmax of 103 and tachypnea upon coming into the ED and was altered on admission; patient was diagnosed with a UTI, and blood cultures were drawn which grew ceftriaxone sensitive E. Coli.  Televisit made with pt, He is alert and oriented x 3 [place]uvian speaking. His wife who translated during the visit. was dx with COVID and UTI, all symptoms resolved. Pt reports he will be going back home to [place] on 8/28. Pt was seen by [facility] - BP meds were renewed. He has PCP appointment on Thursday and URO 8/14.

## 2024-08-21 NOTE — HISTORY OF PRESENT ILLNESS
[FreeTextEntry1] : Brief Hospital Course copied: 89-year-old male history of hypertension, BPH presents with 1 week of dysuria, increased urinary frequency, and now 1 day of rigors/chills and fatigue. Patient was reportedly seen and evaluated by his urologist (Dr. Daniel Norton) this past Thursday for dysuria and started on Keflex. Despite starting antibiotics Friday, pt developed fevers and rigors. Son brought him to urgent care who recommended pt present to ED. Son reports that pt was hospitalized about 20 days ago for a UTI. Pt denies cough, shortness of breath, chest pain, flank pain, abdominal pain or LE edema. (29 Jul 2024 03:06) Patient met SIRS criteria with Tmax of 103 and tachypnea upon coming into the ED and was altered on admission; patient was diagnosed with a UTI, and blood cultures were drawn which grew ceftriaxone sensitive E. Coli.  [Dysuria] : no dysuria [Hematuria - Gross] : no gross hematuria [None] : None

## 2025-01-22 NOTE — PATIENT PROFILE ADULT - FUNCTIONAL ASSESSMENT - BASIC MOBILITY 5.
Length Of Therapy: 3+ years Detail Level: Zone Patient Reported Weight(Optional But Include Units): 158 Add High Risk Medication Management Associated Diagnosis?: No 2 = A lot of assistance